# Patient Record
Sex: MALE | Race: WHITE | NOT HISPANIC OR LATINO | Employment: UNEMPLOYED | URBAN - METROPOLITAN AREA
[De-identification: names, ages, dates, MRNs, and addresses within clinical notes are randomized per-mention and may not be internally consistent; named-entity substitution may affect disease eponyms.]

---

## 2017-07-25 ENCOUNTER — HISTORICAL (OUTPATIENT)
Dept: RADIOLOGY | Facility: HOSPITAL | Age: 62
End: 2017-07-25

## 2017-07-31 ENCOUNTER — HISTORICAL (OUTPATIENT)
Dept: RADIOLOGY | Facility: HOSPITAL | Age: 62
End: 2017-07-31

## 2017-09-27 ENCOUNTER — HISTORICAL (OUTPATIENT)
Dept: RADIOLOGY | Facility: HOSPITAL | Age: 62
End: 2017-09-27

## 2017-09-27 LAB
BUN SERPL-MCNC: 25 MG/DL (ref 10–20)
CALCIUM SERPL-MCNC: 8.1 MG/DL (ref 8–10.5)
CHLORIDE SERPL-SCNC: 107 MMOL/L (ref 100–108)
CO2 SERPL-SCNC: 32 MMOL/L (ref 21–35)
CREAT SERPL-MCNC: 1.33 MG/DL (ref 0.7–1.3)
GLUCOSE SERPL-MCNC: 93 MG/DL (ref 75–116)
POTASSIUM SERPL-SCNC: 3.8 MMOL/L (ref 3.6–5.2)
SODIUM SERPL-SCNC: 140 MMOL/L (ref 135–145)

## 2017-10-09 ENCOUNTER — HISTORICAL (OUTPATIENT)
Dept: RADIOLOGY | Facility: HOSPITAL | Age: 62
End: 2017-10-09

## 2017-10-26 ENCOUNTER — HISTORICAL (OUTPATIENT)
Dept: RADIOLOGY | Facility: HOSPITAL | Age: 62
End: 2017-10-26

## 2018-01-29 ENCOUNTER — HISTORICAL (OUTPATIENT)
Dept: RADIOLOGY | Facility: HOSPITAL | Age: 63
End: 2018-01-29

## 2018-02-19 ENCOUNTER — HISTORICAL (OUTPATIENT)
Dept: SURGERY | Facility: HOSPITAL | Age: 63
End: 2018-02-19

## 2018-02-19 LAB
INR PPP: 1 (ref 2–3)
PROTHROMBIN TIME: 10.9 SECOND(S) (ref 9.3–11.4)

## 2018-05-25 ENCOUNTER — HISTORICAL (OUTPATIENT)
Dept: RADIOLOGY | Facility: HOSPITAL | Age: 63
End: 2018-05-25

## 2018-10-03 ENCOUNTER — HISTORICAL (OUTPATIENT)
Dept: RADIOLOGY | Facility: HOSPITAL | Age: 63
End: 2018-10-03

## 2019-04-16 ENCOUNTER — HISTORICAL (OUTPATIENT)
Dept: RADIOLOGY | Facility: HOSPITAL | Age: 64
End: 2019-04-16

## 2020-01-31 LAB
ABS NEUT (OLG): 5.1 X10(3)/MCL (ref 1.5–6.9)
ALBUMIN SERPL-MCNC: 3.4 GM/DL (ref 3.4–5)
ALBUMIN/GLOB SERPL: 0.8 RATIO
ALP SERPL-CCNC: 100 UNIT/L (ref 30–113)
ALT SERPL-CCNC: 32 UNIT/L (ref 10–45)
APTT PPP: 37.3 SECOND(S) (ref 25–35)
AST SERPL-CCNC: 17 UNIT/L (ref 15–37)
BILIRUB SERPL-MCNC: 0.4 MG/DL (ref 0.1–0.9)
BILIRUBIN DIRECT+TOT PNL SERPL-MCNC: 0.2 MG/DL
BILIRUBIN DIRECT+TOT PNL SERPL-MCNC: 0.2 MG/DL (ref 0–0.3)
BUN SERPL-MCNC: 22 MG/DL (ref 10–20)
CALCIUM SERPL-MCNC: 8.9 MG/DL (ref 8–10.5)
CHLORIDE SERPL-SCNC: 106 MMOL/L (ref 100–108)
CO2 SERPL-SCNC: 30 MMOL/L (ref 21–35)
CREAT SERPL-MCNC: 1.4 MG/DL (ref 0.7–1.3)
ERYTHROCYTE [DISTWIDTH] IN BLOOD BY AUTOMATED COUNT: 15.5 % (ref 11.5–17)
GLOBULIN SER-MCNC: 4.1 GM/DL
GLUCOSE SERPL-MCNC: 101 MG/DL (ref 75–116)
HCT VFR BLD AUTO: 37.4 % (ref 42–52)
HGB BLD-MCNC: 11.7 GM/DL (ref 14–18)
INR PPP: 1.7 (ref 0–1.2)
MCH RBC QN AUTO: 28 PG (ref 27–34)
MCHC RBC AUTO-ENTMCNC: 31 GM/DL (ref 31–36)
MCV RBC AUTO: 90 FL (ref 80–99)
PLATELET # BLD AUTO: 297 X10(3)/MCL (ref 140–400)
PMV BLD AUTO: 13.1 FL (ref 6.8–10)
POTASSIUM SERPL-SCNC: 3.4 MMOL/L (ref 3.6–5.2)
PROT SERPL-MCNC: 7.5 GM/DL (ref 6.4–8.2)
PROTHROMBIN TIME: 17.4 SECOND(S) (ref 9–12)
RBC # BLD AUTO: 4.15 X10(6)/MCL (ref 4.7–6.1)
SODIUM SERPL-SCNC: 143 MMOL/L (ref 135–145)
WBC # SPEC AUTO: 9.6 X10(3)/MCL (ref 4.5–11.5)

## 2020-02-04 ENCOUNTER — HISTORICAL (OUTPATIENT)
Dept: ANESTHESIOLOGY | Facility: HOSPITAL | Age: 65
End: 2020-02-04

## 2020-02-04 LAB
APPEARANCE, UA: CLEAR
APTT PPP: 27.6 SECOND(S) (ref 25–35)
BACTERIA SPEC CULT: NORMAL /HPF
BILIRUB UR QL STRIP: NEGATIVE
COLOR UR: YELLOW
GLUCOSE (UA): NEGATIVE
HGB UR QL STRIP: NEGATIVE
INR PPP: 1 (ref 0–1.2)
KETONES UR QL STRIP: NEGATIVE
LEUKOCYTE ESTERASE UR QL STRIP: NEGATIVE
NITRITE UR QL STRIP: NEGATIVE
PH UR STRIP: 5.5 [PH]
PROT UR QL STRIP: ABNORMAL
PROTHROMBIN TIME: 10.7 SECOND(S) (ref 9–12)
RBC #/AREA URNS HPF: NORMAL /HPF
SP GR UR STRIP: 1.02
SQUAMOUS EPITHELIAL, UA: NORMAL /LPF
UROBILINOGEN UR STRIP-ACNC: 1 EU/DL
WBC #/AREA URNS HPF: NORMAL /[HPF]

## 2020-09-11 ENCOUNTER — HISTORICAL (OUTPATIENT)
Dept: RADIOLOGY | Facility: HOSPITAL | Age: 65
End: 2020-09-11

## 2020-10-01 ENCOUNTER — HISTORICAL (OUTPATIENT)
Dept: RADIOLOGY | Facility: HOSPITAL | Age: 65
End: 2020-10-01

## 2021-01-06 ENCOUNTER — HISTORICAL (OUTPATIENT)
Dept: LAB | Facility: HOSPITAL | Age: 66
End: 2021-01-06

## 2021-01-06 LAB
FLUAV AG UPPER RESP QL IA.RAPID: NEGATIVE
FLUBV AG UPPER RESP QL IA.RAPID: NEGATIVE
SARS-COV-2 RNA RESP QL NAA+PROBE: NOT DETECTED

## 2021-03-04 ENCOUNTER — HISTORICAL (OUTPATIENT)
Dept: ADMINISTRATIVE | Facility: HOSPITAL | Age: 66
End: 2021-03-04

## 2021-03-04 LAB
BUN SERPL-MCNC: 27.6 MG/DL (ref 8.4–25.7)
CALCIUM SERPL-MCNC: 7.3 MG/DL (ref 8.8–10)
CHLORIDE SERPL-SCNC: 102 MMOL/L (ref 98–107)
CO2 SERPL-SCNC: 31 MMOL/L (ref 23–31)
CREAT SERPL-MCNC: 1.48 MG/DL (ref 0.73–1.18)
CREAT/UREA NIT SERPL: 19
GLUCOSE SERPL-MCNC: 76 MG/DL (ref 82–115)
HCT VFR BLD AUTO: 27.6 % (ref 42–52)
HGB BLD-MCNC: 8.1 GM/DL (ref 14–18)
POTASSIUM SERPL-SCNC: 3.2 MMOL/L (ref 3.5–5.1)
SODIUM SERPL-SCNC: 142 MMOL/L (ref 136–145)

## 2021-03-08 ENCOUNTER — HISTORICAL (OUTPATIENT)
Dept: ADMINISTRATIVE | Facility: HOSPITAL | Age: 66
End: 2021-03-08

## 2021-03-08 LAB
ALBUMIN SERPL-MCNC: 2.9 GM/DL (ref 3.4–4.8)
ALBUMIN/GLOB SERPL: 0.9 RATIO (ref 1.1–2)
ALP SERPL-CCNC: 111 UNIT/L (ref 40–150)
ALT SERPL-CCNC: 40 UNIT/L (ref 0–55)
AST SERPL-CCNC: 62 UNIT/L (ref 5–34)
BILIRUB SERPL-MCNC: 0.2 MG/DL
BILIRUBIN DIRECT+TOT PNL SERPL-MCNC: 0.1 MG/DL (ref 0–0.5)
BILIRUBIN DIRECT+TOT PNL SERPL-MCNC: 0.1 MG/DL (ref 0–0.8)
BUN SERPL-MCNC: 14.3 MG/DL (ref 8.4–25.7)
CALCIUM SERPL-MCNC: 7.8 MG/DL (ref 8.8–10)
CHLORIDE SERPL-SCNC: 100 MMOL/L (ref 98–107)
CO2 SERPL-SCNC: 35 MMOL/L (ref 23–31)
CREAT SERPL-MCNC: 1.2 MG/DL (ref 0.73–1.18)
GLOBULIN SER-MCNC: 3.3 GM/DL (ref 2.4–3.5)
GLUCOSE SERPL-MCNC: 81 MG/DL (ref 82–115)
HCT VFR BLD AUTO: 29.6 % (ref 42–52)
HGB BLD-MCNC: 8.4 GM/DL (ref 14–18)
POTASSIUM SERPL-SCNC: 4.5 MMOL/L (ref 3.5–5.1)
PROT SERPL-MCNC: 6.2 GM/DL (ref 5.8–7.6)
SODIUM SERPL-SCNC: 143 MMOL/L (ref 136–145)

## 2021-03-25 ENCOUNTER — HISTORICAL (OUTPATIENT)
Dept: ENDOSCOPY | Facility: HOSPITAL | Age: 66
End: 2021-03-25

## 2021-08-03 ENCOUNTER — HISTORICAL (OUTPATIENT)
Dept: CARDIOLOGY | Facility: HOSPITAL | Age: 66
End: 2021-08-03

## 2021-08-03 LAB
INR PPP: 3.5 (ref 0–1.3)
INR PPP: 3.6 (ref 0–1.3)
PROTHROMBIN TIME: 34.4 SECOND(S) (ref 12.5–14.5)
PROTHROMBIN TIME: 35 SECOND(S) (ref 12.5–14.5)

## 2021-08-05 ENCOUNTER — HISTORICAL (OUTPATIENT)
Dept: CARDIOLOGY | Facility: HOSPITAL | Age: 66
End: 2021-08-05

## 2021-08-05 LAB
INR PPP: 3.9 (ref 0–1.3)
PROTHROMBIN TIME: 37.5 SECOND(S) (ref 12.5–14.5)

## 2021-08-12 ENCOUNTER — HISTORICAL (OUTPATIENT)
Dept: CARDIOLOGY | Facility: HOSPITAL | Age: 66
End: 2021-08-12

## 2021-08-12 LAB
INR PPP: 1.2 (ref 0–1.3)
PROTHROMBIN TIME: 15.3 SECOND(S) (ref 12.5–14.5)

## 2021-09-16 ENCOUNTER — HISTORICAL (OUTPATIENT)
Dept: RADIOLOGY | Facility: HOSPITAL | Age: 66
End: 2021-09-16

## 2021-11-08 ENCOUNTER — HISTORICAL (OUTPATIENT)
Dept: LAB | Facility: HOSPITAL | Age: 66
End: 2021-11-08

## 2021-11-08 LAB
ABS NEUT (OLG): 19.4 X10(3)/MCL (ref 1.5–6.9)
ALBUMIN SERPL-MCNC: 3.2 GM/DL (ref 3.4–4.8)
ALBUMIN/GLOB SERPL: 0.9 RATIO (ref 1.1–2)
ALP SERPL-CCNC: 116 UNIT/L (ref 40–150)
ALT SERPL-CCNC: 17 UNIT/L (ref 0–55)
APPEARANCE, UA: CLEAR
AST SERPL-CCNC: 24 UNIT/L (ref 5–34)
BACTERIA SPEC CULT: ABNORMAL /HPF
BASOPHILS # BLD AUTO: 0 X10(3)/MCL (ref 0–0.1)
BASOPHILS NFR BLD AUTO: 0 % (ref 0–1)
BILIRUB SERPL-MCNC: 0.8 MG/DL
BILIRUB UR QL STRIP: NEGATIVE
BILIRUBIN DIRECT+TOT PNL SERPL-MCNC: 0.4 MG/DL (ref 0–0.5)
BILIRUBIN DIRECT+TOT PNL SERPL-MCNC: 0.4 MG/DL (ref 0–0.8)
BUN SERPL-MCNC: 16 MG/DL (ref 8.4–25.7)
CALCIUM SERPL-MCNC: 8.5 MG/DL (ref 8.7–10.5)
CHLORIDE SERPL-SCNC: 98 MMOL/L (ref 98–107)
CO2 SERPL-SCNC: 28 MMOL/L (ref 23–31)
COLOR UR: YELLOW
CREAT SERPL-MCNC: 1.75 MG/DL (ref 0.73–1.18)
ERYTHROCYTE [DISTWIDTH] IN BLOOD BY AUTOMATED COUNT: 14.9 % (ref 11.5–17)
FLUAV AG UPPER RESP QL IA.RAPID: NEGATIVE
FLUBV AG UPPER RESP QL IA.RAPID: NEGATIVE
GLOBULIN SER-MCNC: 3.6 GM/DL (ref 2.4–3.5)
GLUCOSE (UA): NEGATIVE MG/DL
GLUCOSE SERPL-MCNC: 124 MG/DL (ref 82–115)
HCT VFR BLD AUTO: 34.4 % (ref 42–52)
HGB BLD-MCNC: 11.2 GM/DL (ref 14–18)
HGB UR QL STRIP: ABNORMAL
IMM GRANULOCYTES # BLD AUTO: 0.34 10*3/UL (ref 0–0.02)
IMM GRANULOCYTES NFR BLD AUTO: 1.5 % (ref 0–0.43)
KETONES UR QL STRIP: NEGATIVE MG/DL
LEUKOCYTE ESTERASE UR QL STRIP: NEGATIVE
LYMPHOCYTES # BLD AUTO: 1.4 X10(3)/MCL (ref 0.5–4.1)
LYMPHOCYTES NFR BLD AUTO: 6 % (ref 15–40)
MCH RBC QN AUTO: 29 PG (ref 27–34)
MCHC RBC AUTO-ENTMCNC: 33 GM/DL (ref 31–36)
MCV RBC AUTO: 88 FL (ref 80–99)
MONOCYTES # BLD AUTO: 2.1 X10(3)/MCL (ref 0–1.1)
MONOCYTES NFR BLD AUTO: 9 % (ref 4–12)
NEUTROPHILS # BLD AUTO: 19.4 X10(3)/MCL (ref 1.5–6.9)
NEUTROPHILS NFR BLD AUTO: 83 % (ref 43–75)
NITRITE UR QL STRIP: NEGATIVE
PH UR STRIP: 5 [PH] (ref 4.6–8)
PLATELET # BLD AUTO: 260 X10(3)/MCL (ref 140–400)
PMV BLD AUTO: 13 FL (ref 6.8–10)
POTASSIUM SERPL-SCNC: 2.7 MMOL/L (ref 3.5–5.1)
PROT SERPL-MCNC: 6.8 GM/DL (ref 5.8–7.6)
PROT UR QL STRIP: NEGATIVE MG/DL
RBC # BLD AUTO: 3.91 X10(6)/MCL (ref 4.7–6.1)
RBC #/AREA URNS HPF: ABNORMAL /HPF
SARS-COV-2 RNA RESP QL NAA+PROBE: NEGATIVE
SODIUM SERPL-SCNC: 136 MMOL/L (ref 136–145)
SP GR UR STRIP: 1.01 (ref 1–1.03)
SQUAMOUS EPITHELIAL, UA: ABNORMAL /LPF
UROBILINOGEN UR STRIP-ACNC: 0.2 EU/DL
WBC # SPEC AUTO: 23.2 X10(3)/MCL (ref 4.5–11.5)
WBC #/AREA URNS HPF: ABNORMAL /[HPF]

## 2021-11-19 ENCOUNTER — HISTORICAL (OUTPATIENT)
Dept: ADMINISTRATIVE | Facility: HOSPITAL | Age: 66
End: 2021-11-19

## 2021-11-28 ENCOUNTER — HISTORICAL (OUTPATIENT)
Dept: ADMINISTRATIVE | Facility: HOSPITAL | Age: 66
End: 2021-11-28

## 2021-11-28 LAB
ABS NEUT (OLG): 6.16 X10(3)/MCL (ref 2.1–9.2)
ALBUMIN SERPL-MCNC: 2.3 GM/DL (ref 3.4–4.8)
ALBUMIN/GLOB SERPL: 0.5 RATIO (ref 1.1–2)
ALP SERPL-CCNC: 140 UNIT/L (ref 40–150)
ALT SERPL-CCNC: <5 UNIT/L (ref 0–55)
AST SERPL-CCNC: 16 UNIT/L (ref 5–34)
BASOPHILS # BLD AUTO: 0.2 X10(3)/MCL (ref 0–0.2)
BASOPHILS NFR BLD AUTO: 2 %
BILIRUB SERPL-MCNC: 0.3 MG/DL
BILIRUBIN DIRECT+TOT PNL SERPL-MCNC: 0.1 MG/DL (ref 0–0.8)
BILIRUBIN DIRECT+TOT PNL SERPL-MCNC: 0.2 MG/DL (ref 0–0.5)
BUN SERPL-MCNC: 9.7 MG/DL (ref 8.4–25.7)
CALCIUM SERPL-MCNC: 8.1 MG/DL (ref 8.7–10.5)
CHLORIDE SERPL-SCNC: 107 MMOL/L (ref 98–107)
CO2 SERPL-SCNC: 26 MMOL/L (ref 23–31)
CREAT SERPL-MCNC: 1.48 MG/DL (ref 0.73–1.18)
CRP SERPL-MCNC: 5.42 MG/DL
EOSINOPHIL # BLD AUTO: 0.4 X10(3)/MCL (ref 0–0.9)
EOSINOPHIL NFR BLD AUTO: 4 %
ERYTHROCYTE [DISTWIDTH] IN BLOOD BY AUTOMATED COUNT: 15.2 % (ref 11.5–17)
ERYTHROCYTE [SEDIMENTATION RATE] IN BLOOD: 86 MM/HR (ref 0–15)
GENTAMICIN TROUGH SERPL-MCNC: <0.5 UG/ML (ref 0–2)
GLOBULIN SER-MCNC: 4.4 GM/DL (ref 2.4–3.5)
GLUCOSE SERPL-MCNC: 91 MG/DL (ref 82–115)
HCT VFR BLD AUTO: 27 % (ref 42–52)
HGB BLD-MCNC: 8.4 GM/DL (ref 14–18)
LYMPHOCYTES # BLD AUTO: 2.1 X10(3)/MCL (ref 0.6–4.6)
LYMPHOCYTES NFR BLD AUTO: 21 %
MCH RBC QN AUTO: 27.5 PG (ref 27–31)
MCHC RBC AUTO-ENTMCNC: 31.1 GM/DL (ref 33–36)
MCV RBC AUTO: 88.5 FL (ref 80–94)
MONOCYTES # BLD AUTO: 1.3 X10(3)/MCL (ref 0.1–1.3)
MONOCYTES NFR BLD AUTO: 13 %
NEUTROPHILS # BLD AUTO: 6.16 X10(3)/MCL (ref 2.1–9.2)
NEUTROPHILS NFR BLD AUTO: 60 %
PLATELET # BLD AUTO: 652 X10(3)/MCL (ref 130–400)
PMV BLD AUTO: 12.3 FL (ref 9.4–12.4)
POTASSIUM SERPL-SCNC: 4.4 MMOL/L (ref 3.5–5.1)
PROT SERPL-MCNC: 6.7 GM/DL (ref 5.8–7.6)
RBC # BLD AUTO: 3.05 X10(6)/MCL (ref 4.7–6.1)
SODIUM SERPL-SCNC: 143 MMOL/L (ref 136–145)
WBC # SPEC AUTO: 10.2 X10(3)/MCL (ref 4.5–11.5)

## 2021-12-04 ENCOUNTER — HISTORICAL (OUTPATIENT)
Dept: ADMINISTRATIVE | Facility: HOSPITAL | Age: 66
End: 2021-12-04

## 2021-12-04 LAB
ABS NEUT (OLG): 4.54 X10(3)/MCL (ref 2.1–9.2)
ALBUMIN SERPL-MCNC: 2.7 GM/DL (ref 3.4–4.8)
ALBUMIN/GLOB SERPL: 0.6 RATIO (ref 1.1–2)
ALP SERPL-CCNC: 166 UNIT/L (ref 40–150)
ALT SERPL-CCNC: <5 UNIT/L (ref 0–55)
AST SERPL-CCNC: 17 UNIT/L (ref 5–34)
BASOPHILS # BLD AUTO: 0.1 X10(3)/MCL (ref 0–0.2)
BASOPHILS NFR BLD AUTO: 2 %
BILIRUB SERPL-MCNC: 0.1 MG/DL
BILIRUBIN DIRECT+TOT PNL SERPL-MCNC: 0 MG/DL (ref 0–0.8)
BILIRUBIN DIRECT+TOT PNL SERPL-MCNC: 0.1 MG/DL (ref 0–0.5)
BUN SERPL-MCNC: 21.9 MG/DL (ref 8.4–25.7)
CALCIUM SERPL-MCNC: 7.4 MG/DL (ref 8.7–10.5)
CHLORIDE SERPL-SCNC: 98 MMOL/L (ref 98–107)
CO2 SERPL-SCNC: 30 MMOL/L (ref 23–31)
CREAT SERPL-MCNC: 1.97 MG/DL (ref 0.73–1.18)
EOSINOPHIL # BLD AUTO: 0.2 X10(3)/MCL (ref 0–0.9)
EOSINOPHIL NFR BLD AUTO: 2 %
ERYTHROCYTE [DISTWIDTH] IN BLOOD BY AUTOMATED COUNT: 15.2 % (ref 11.5–17)
GENTAMICIN TROUGH SERPL-MCNC: 0.9 UG/ML (ref 0–2)
GLOBULIN SER-MCNC: 4.6 GM/DL (ref 2.4–3.5)
GLUCOSE SERPL-MCNC: 97 MG/DL (ref 82–115)
HCT VFR BLD AUTO: 29.6 % (ref 42–52)
HGB BLD-MCNC: 9.3 GM/DL (ref 14–18)
LYMPHOCYTES # BLD AUTO: 2.1 X10(3)/MCL (ref 0.6–4.6)
LYMPHOCYTES NFR BLD AUTO: 26 %
MCH RBC QN AUTO: 27.5 PG (ref 27–31)
MCHC RBC AUTO-ENTMCNC: 31.4 GM/DL (ref 33–36)
MCV RBC AUTO: 87.6 FL (ref 80–94)
MONOCYTES # BLD AUTO: 1 X10(3)/MCL (ref 0.1–1.3)
MONOCYTES NFR BLD AUTO: 12 %
NEUTROPHILS # BLD AUTO: 4.54 X10(3)/MCL (ref 2.1–9.2)
NEUTROPHILS NFR BLD AUTO: 57 %
PLATELET # BLD AUTO: 411 X10(3)/MCL (ref 130–400)
PMV BLD AUTO: 12.4 FL (ref 9.4–12.4)
POTASSIUM SERPL-SCNC: 3.5 MMOL/L (ref 3.5–5.1)
PROT SERPL-MCNC: 7.3 GM/DL (ref 5.8–7.6)
RBC # BLD AUTO: 3.38 X10(6)/MCL (ref 4.7–6.1)
SODIUM SERPL-SCNC: 139 MMOL/L (ref 136–145)
WBC # SPEC AUTO: 7.9 X10(3)/MCL (ref 4.5–11.5)

## 2021-12-10 ENCOUNTER — HISTORICAL (OUTPATIENT)
Dept: ADMINISTRATIVE | Facility: HOSPITAL | Age: 66
End: 2021-12-10

## 2021-12-10 LAB
ABS NEUT (OLG): 5.17 X10(3)/MCL (ref 2.1–9.2)
ALBUMIN SERPL-MCNC: 2.8 GM/DL (ref 3.4–4.8)
ALBUMIN/GLOB SERPL: 0.6 RATIO (ref 1.1–2)
ALP SERPL-CCNC: 114 UNIT/L (ref 40–150)
ALT SERPL-CCNC: <5 UNIT/L (ref 0–55)
AST SERPL-CCNC: 24 UNIT/L (ref 5–34)
BASOPHILS # BLD AUTO: 0.1 X10(3)/MCL (ref 0–0.2)
BASOPHILS NFR BLD AUTO: 1 %
BILIRUB SERPL-MCNC: 0.2 MG/DL
BILIRUBIN DIRECT+TOT PNL SERPL-MCNC: 0.1 MG/DL (ref 0–0.5)
BILIRUBIN DIRECT+TOT PNL SERPL-MCNC: 0.1 MG/DL (ref 0–0.8)
BUN SERPL-MCNC: 27.1 MG/DL (ref 8.4–25.7)
CALCIUM SERPL-MCNC: 7 MG/DL (ref 8.7–10.5)
CHLORIDE SERPL-SCNC: 97 MMOL/L (ref 98–107)
CO2 SERPL-SCNC: 32 MMOL/L (ref 23–31)
CREAT SERPL-MCNC: 1.76 MG/DL (ref 0.73–1.18)
CRP SERPL-MCNC: 1.12 MG/DL
EOSINOPHIL # BLD AUTO: 0.5 X10(3)/MCL (ref 0–0.9)
EOSINOPHIL NFR BLD AUTO: 5 %
ERYTHROCYTE [DISTWIDTH] IN BLOOD BY AUTOMATED COUNT: 16.2 % (ref 11.5–17)
ERYTHROCYTE [SEDIMENTATION RATE] IN BLOOD: 38 MM/HR (ref 0–15)
GENTAMICIN TROUGH SERPL-MCNC: 0.5 UG/ML (ref 0–2)
GLOBULIN SER-MCNC: 4.7 GM/DL (ref 2.4–3.5)
GLUCOSE SERPL-MCNC: 91 MG/DL (ref 82–115)
HCT VFR BLD AUTO: 29.4 % (ref 42–52)
HGB BLD-MCNC: 9 GM/DL (ref 14–18)
LYMPHOCYTES # BLD AUTO: 2.4 X10(3)/MCL (ref 0.6–4.6)
LYMPHOCYTES NFR BLD AUTO: 26 %
MCH RBC QN AUTO: 27.7 PG (ref 27–31)
MCHC RBC AUTO-ENTMCNC: 30.6 GM/DL (ref 33–36)
MCV RBC AUTO: 90.5 FL (ref 80–94)
MONOCYTES # BLD AUTO: 1.1 X10(3)/MCL (ref 0.1–1.3)
MONOCYTES NFR BLD AUTO: 12 %
NEUTROPHILS # BLD AUTO: 5.17 X10(3)/MCL (ref 2.1–9.2)
NEUTROPHILS NFR BLD AUTO: 55 %
PLATELET # BLD AUTO: 318 X10(3)/MCL (ref 130–400)
PMV BLD AUTO: 13 FL (ref 9.4–12.4)
POTASSIUM SERPL-SCNC: 4.2 MMOL/L (ref 3.5–5.1)
PROT SERPL-MCNC: 7.5 GM/DL (ref 5.8–7.6)
RBC # BLD AUTO: 3.25 X10(6)/MCL (ref 4.7–6.1)
SODIUM SERPL-SCNC: 141 MMOL/L (ref 136–145)
WBC # SPEC AUTO: 9.3 X10(3)/MCL (ref 4.5–11.5)

## 2022-04-10 ENCOUNTER — HISTORICAL (OUTPATIENT)
Dept: ADMINISTRATIVE | Facility: HOSPITAL | Age: 67
End: 2022-04-10
Payer: MEDICARE

## 2022-04-27 VITALS
BODY MASS INDEX: 18.85 KG/M2 | SYSTOLIC BLOOD PRESSURE: 108 MMHG | HEIGHT: 70 IN | WEIGHT: 131.63 LBS | OXYGEN SATURATION: 94 % | DIASTOLIC BLOOD PRESSURE: 65 MMHG

## 2022-04-29 NOTE — OP NOTE
Patient:   Claudio Arango Junior             MRN: [id number]            FIN: [id number]               Age:   64 years     Sex:  Male     :  1955   Associated Diagnoses:   Right inguinal hernia   Author:   Cynthia Cruz MD      Operative Note   Operative Information   Date/ Time:  2020 14:20:00.     Procedures Performed: Procedure Code   Repair initial inguinal hernia, age 5 years or older; reducible (83096)..     Indications: 64-year-old white male with enlarging symptomatic right inguinal hernia electing to undergo right inguinal hernia repair and mesh.     Preoperative Diagnosis: Right inguinal hernia (YDL04-NF K40.90).     Postoperative Diagnosis: Right inguinal hernia ([id number]-KG K40.90).     Surgeon: Cynthia Cruz MD.     Anesthesia: General.     Speciman Removed: Indirect inguinal hernia sac.     Description of Procedure/Findings/    Complications: Patient was brought to the operative theater laid in supine position and general endotracheal intubation and anasthesia provided with preoperative antibiotics administered.  The area of the bilateral groins were then sterilely prepped and draped in normal surgical fashion using chlorhexidine after all areas were trimmed of hair.  The inguinal crease along the right groin was then infiltrated 1% lidocaine and epinephrine.  A 15 blade was used to incise the skin with dissection down to underlying fatty tissues.  Bovie cauterization was used to dissect down to the level of the external oblique fascia.  The fascia was then incised using a 15 blade and lengthened lateral to medial using Metzenbaum scissors.  The cord structures were then identified and encircled using a Penrose drain.  An indirect hernia sac was identified and it was carefully dissected free of the spermatic cord and spermatic vessels.  Upon complete dissection down to the level of the deep inguinal ring it was then ligated using 2-0 silk and transected.  The ends were made hemostatic  using Bovie cauterization and it was released back into the abdomen.  The hernia sac was then sent to pathology.  The floor of the inguinal wall appeared to be healthy and in good condition.  The cavity was made hemostatic using Bovie cauterization.  A segment of mesh was then fashioned around the cord structures and sutured to the pubic tubercle using 3-0 Prolene.  It was then carried over and encircled around the spermatic vessels and sutured interruptedly along the conjoined tendon superiorly.  There was no encroachment upon the cord structures.  The cavity appeared to be very hemostatic at the time of closure.  The external oblique fascia was then reapproximated using 3-0 Vicryl running lateral to medial re-creating the external ring as well as Gianfranco's fascia was reapproximated using 3-0 Vicryl interrupted.  The skin and subcutaneous tissues were then reapproximated using 3-0 Vicryl and running 4-0 subcuticular Monocryl.  Sterile dressing was then placed upon the wound.  Patient was then relieved of anesthesia stable condition transfer the postanesthesia care unit..     Esimated blood loss: loss  5  cc.     Complications: None.     Notes: Patient given instructions to keep the wound dry and ice packs to the groin and to restart anticoagulation therapy within 2 days after surgery while monitoring for hematoma formation.

## 2022-04-29 NOTE — OP NOTE
DATE OF SURGERY:    02/19/2018    SURGEON:  Placido Roe MD    PREOPERATIVE DIAGNOSES:  Chest discomfort in patient with known history of coronary artery disease, coronary artery angioplasty, coronary artery angioplasty and stenting with a history of abnormal nuclear scan which places the patient at increased risk of future adverse cardiovascular outcome such as increased risk of cardiovascular morbidity, increased risk of cardiovascular mortality, increased risk of cardiovascular complications and overall poor medical outcomes and prognosis.    POSTOPERATIVE DIAGNOSES:  Chest discomfort in patient with known history of coronary artery disease, coronary artery angioplasty, coronary artery angioplasty and stenting with a history of abnormal nuclear scan which places the patient at increased risk of future adverse cardiovascular outcome such as increased risk of cardiovascular morbidity, increased risk of cardiovascular mortality, increased risk of cardiovascular complications and overall poor medical outcomes and prognosis.    PROCEDURES:  Left heart catheterization, angiography of right external iliac artery.    PROCEDURE IN DETAIL:  After the procedure was explained to the patient in detail and consents obtained, the right groin was prepped and draped in a sterile fashion.  #5 [place] sheath was placed to the right common femoral artery.  JL4 catheter was advanced to the ascending aorta.  Cannulation of the left main coronary artery was performed.  Angiography revealed luminal irregularities of the coronary circulation.  The previously placed stent in the distal left anterior descending coronary artery was noted to be patent with approximately 50% in-stent restenosis.  Catheter was exchanged for a JR4 catheter.  Cannulation of the right coronary artery was performed.  Angiography revealed the right coronary artery to be dominant in distribution with luminal irregularities.  Catheter was then placed  into the right external iliac artery.  Angiography revealed adequate sheath placement in the right common femoral artery.  The patient tolerated the procedure well.    IMPRESSION:    1. Luminal irregularities of the coronary circulation.   2. Patent stent in the distal left anterior descending coronary artery.   3. Right dominant coronary circulation.   4. Maximize medical treatment.   5. The patient is cleared for back injections.   6. Restart Coumadin due to prosthetic aortic valve.   7. Lovenox bridging.  8. 0 ml blood loss.   9. No specimens removed.   10. Cardiac rehab consultation.   11. Smoking cessation.   12. Sodium restriction.   13. Cardiac diet.        ______________________________  MD CORTNEY Mcnally/MALIHA  DD:  02/19/2018  Time:  11:43AM  DT:  02/19/2018  Time:  04:40PM  Job #:  [id number]

## 2022-04-29 NOTE — H&P
Patient:   Patric Snyder            MRN: [id number]            FIN: [id number]               Age:   65 years     Sex:  Male     :  1955   Associated Diagnoses:   None   Author:   Christopher Jiménez MD      CC: anemia    Subjective: 65 year old with new multifactorial and symptomatic anemia requiring transfusion. No overt bleeding. previous colonoscopy over 10 years ago.  No other GI complaints.  On coumadin for mechanical aortic valve.    Past Medical History:  Anemia    Review of Systems:  Constitutional: no fever, fatigue, weakness  Eye: no vision loss, eye redness, drainage, or pain  ENMT: no sore throat, ear pain, sinus pain/congestion, nasal congestion/drainage  Respiratory: no cough, no wheezing, no shortness of breath  Cardiovascular: no chest pain, no palpitations, no edema  Gastrointestinal: no nausea, vomiting, or diarrhea. No abdominal pain  Genitourinary: no dysuria, no urinary frequency or urgency, no hematuria  Hema/Lymph: no abnormal bruising or bleeding  Endocrine: no heat or cold intolerance, no excessive thirst or excessive urination  Musculoskeletal: no muscle or joint pain, no joint swelling  Integumentary: no skin rash or abnormal lesion  Neurologic: no headache, no dizziness, no weakness or numbness      Physical Exam:  General appearance: alert, cooperative, no distress  HENT: Normocephalic, atraumatic, neck symmetrical, no nasal discharge   Lungs: clear to auscultation bilaterally, symmetric chest wall expansion bilaterally  Heart: regular rate and rhythm without rub; no displacement of the PMI   Abdomen: soft, non-tender, non-distended, normal bowel sounds  Extremities: extremities symmetric; no clubbing, cyanosis, or edema  Neurologic: Alert and oriented X 3, normal strength, normal coordination and gait      Labs:  reviewed.     Imaging:  none    Assessment:  Anemia    Plan:  EGD/Colon

## 2022-05-26 ENCOUNTER — OFFICE VISIT (OUTPATIENT)
Dept: INFECTIOUS DISEASES | Facility: CLINIC | Age: 67
End: 2022-05-26
Payer: MEDICARE

## 2022-05-26 VITALS
RESPIRATION RATE: 16 BRPM | TEMPERATURE: 98 F | DIASTOLIC BLOOD PRESSURE: 85 MMHG | WEIGHT: 121.31 LBS | SYSTOLIC BLOOD PRESSURE: 147 MMHG | HEIGHT: 70 IN | OXYGEN SATURATION: 100 % | HEART RATE: 59 BPM | BODY MASS INDEX: 17.37 KG/M2

## 2022-05-26 DIAGNOSIS — Q89.01 ASPLENIA (CONGENITAL): ICD-10-CM

## 2022-05-26 DIAGNOSIS — N18.30 STAGE 3 CHRONIC KIDNEY DISEASE, UNSPECIFIED WHETHER STAGE 3A OR 3B CKD: ICD-10-CM

## 2022-05-26 DIAGNOSIS — I33.0 ACUTE AND SUBACUTE INFECTIVE ENDOCARDITIS: Primary | ICD-10-CM

## 2022-05-26 DIAGNOSIS — I63.9 CEREBROVASCULAR ACCIDENT (CVA), UNSPECIFIED MECHANISM: ICD-10-CM

## 2022-05-26 DIAGNOSIS — I42.9 CARDIOMYOPATHY, UNSPECIFIED TYPE: ICD-10-CM

## 2022-05-26 DIAGNOSIS — T50.905A ADVERSE EFFECT OF DRUG, INITIAL ENCOUNTER: ICD-10-CM

## 2022-05-26 DIAGNOSIS — Z95.2 S/P TAVR (TRANSCATHETER AORTIC VALVE REPLACEMENT): ICD-10-CM

## 2022-05-26 DIAGNOSIS — Z95.0 PRESENCE OF CARDIAC PACEMAKER: ICD-10-CM

## 2022-05-26 DIAGNOSIS — A41.01 SEPSIS DUE TO STAPHYLOCOCCUS AUREUS: ICD-10-CM

## 2022-05-26 DIAGNOSIS — Z79.2 LONG TERM (CURRENT) USE OF ANTIBIOTICS: ICD-10-CM

## 2022-05-26 PROBLEM — Z95.3 S/P TAVR (TRANSCATHETER AORTIC VALVE REPLACEMENT): Status: ACTIVE | Noted: 2019-06-18

## 2022-05-26 PROCEDURE — 99999 PR PBB SHADOW E&M-EST. PATIENT-LVL V: CPT | Mod: PBBFAC,,, | Performed by: GENERAL PRACTICE

## 2022-05-26 PROCEDURE — 99999 PR PBB SHADOW E&M-EST. PATIENT-LVL V: ICD-10-PCS | Mod: PBBFAC,,, | Performed by: GENERAL PRACTICE

## 2022-05-26 PROCEDURE — 99214 PR OFFICE/OUTPT VISIT, EST, LEVL IV, 30-39 MIN: ICD-10-PCS | Mod: S$PBB,,, | Performed by: GENERAL PRACTICE

## 2022-05-26 PROCEDURE — 99214 OFFICE O/P EST MOD 30 MIN: CPT | Mod: S$PBB,,, | Performed by: GENERAL PRACTICE

## 2022-05-26 PROCEDURE — 99215 OFFICE O/P EST HI 40 MIN: CPT | Mod: PBBFAC | Performed by: GENERAL PRACTICE

## 2022-05-26 RX ORDER — DOCUSATE SODIUM 100 MG/1
1 CAPSULE, LIQUID FILLED ORAL DAILY
COMMUNITY

## 2022-05-26 RX ORDER — WARFARIN 2.5 MG/1
1 TABLET ORAL DAILY
COMMUNITY
Start: 2021-08-12

## 2022-05-26 RX ORDER — EVOLOCUMAB 140 MG/ML
140 INJECTION, SOLUTION SUBCUTANEOUS
COMMUNITY

## 2022-05-26 RX ORDER — AMIODARONE HYDROCHLORIDE 200 MG/1
200 TABLET ORAL DAILY
COMMUNITY

## 2022-05-26 RX ORDER — METHADONE HYDROCHLORIDE 10 MG/1
1 TABLET ORAL EVERY 8 HOURS
COMMUNITY

## 2022-05-26 RX ORDER — POTASSIUM CHLORIDE 750 MG/1
10 CAPSULE, EXTENDED RELEASE ORAL DAILY
COMMUNITY

## 2022-05-26 RX ORDER — FUROSEMIDE 40 MG/1
40 TABLET ORAL DAILY
COMMUNITY

## 2022-05-26 RX ORDER — CEPHALEXIN 500 MG/1
500 TABLET ORAL EVERY 12 HOURS
Qty: 180 TABLET | Refills: 1 | Status: SHIPPED | OUTPATIENT
Start: 2022-05-26 | End: 2022-05-30

## 2022-05-26 RX ORDER — ATORVASTATIN CALCIUM 80 MG/1
80 TABLET, FILM COATED ORAL NIGHTLY
COMMUNITY

## 2022-05-26 RX ORDER — CARVEDILOL 25 MG/1
1 TABLET ORAL DAILY
COMMUNITY

## 2022-05-26 RX ORDER — DULOXETIN HYDROCHLORIDE 60 MG/1
1 CAPSULE, DELAYED RELEASE ORAL DAILY
COMMUNITY

## 2022-05-26 RX ORDER — SACUBITRIL AND VALSARTAN 49; 51 MG/1; MG/1
1 TABLET, FILM COATED ORAL 2 TIMES DAILY
COMMUNITY

## 2022-05-26 RX ORDER — ZOLPIDEM TARTRATE 12.5 MG/1
10 TABLET, FILM COATED, EXTENDED RELEASE ORAL NIGHTLY
COMMUNITY

## 2022-05-26 RX ORDER — SILDENAFIL 100 MG/1
1 TABLET, FILM COATED ORAL
COMMUNITY

## 2022-05-26 RX ORDER — PREGABALIN 150 MG/1
150 CAPSULE ORAL DAILY
COMMUNITY

## 2022-05-26 RX ORDER — CEPHALEXIN 500 MG/1
500 TABLET ORAL 4 TIMES DAILY
Qty: 40 TABLET | Refills: 0 | Status: SHIPPED | OUTPATIENT
Start: 2022-05-26 | End: 2022-05-26

## 2022-05-26 NOTE — PROGRESS NOTES
Subjective:       Patient ID: Patric Snyder 66 y.o.     Chief Complaint:   Chief Complaint   Patient presents with    Follow-up     C/o antibiotic causing a rash on his face, arms and hands.        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  Dr. Leon had prescribed a regimen of cefazolin, rifampin, gentamicin.  He had completed his course on 12/17/2021, had his PICC line removed.  He presents today for follow-up  He reports being off antibiotics since removing his PICC line, with no fevers, no chills, no new symptoms.  He denies any changes in his urinary habits, no diarrhea, no constipation.    05/26/2022:  At our previous visit, we had started the patient on doxycycline for chronic suppression given his prostatic valve, ICD and known MSSA bacteremia he presents today with concerns about significant sunburn on his bilateral upper extremities and face after he went on a golfing trip with friends.  Otherwise denies any GI intolerance, no fevers, no chills, no concern for ongoing infection.         Past Medical History:   Diagnosis Date    Heart failure, unspecified     Hypertension         Past Surgical History:   Procedure Laterality Date    BACK SURGERY      CARDIAC DEFIBRILLATOR PLACEMENT      FINGER AMPUTATION      SPLENECTOMY, PARTIAL          Social History     Socioeconomic History    Marital status: Unknown   Tobacco Use    Smoking status: Never Smoker    Smokeless tobacco: Current User     Types: Chew   Substance and Sexual Activity    Alcohol use: Not Currently    Drug use: Never    Sexual activity: Yes     Partners: Female        Family History   Problem Relation Age of Onset    Diabetes Mother         Review of patient's allergies indicates:  "  Allergen Reactions    Doxycycline hyclate Rash     Photosensitivity        Immunization History   Administered Date(s) Administered    Tdap 12/02/2015        Review of Systems   Constitutional: Negative for chills, fever and weight loss.   HENT: Negative for sore throat.    Eyes: Negative for blurred vision.   Respiratory: Negative for cough, sputum production, shortness of breath and wheezing.    Cardiovascular: Negative for chest pain.   Gastrointestinal: Negative for abdominal pain, diarrhea, nausea and vomiting.   Genitourinary: Negative for dysuria, frequency and urgency.   Musculoskeletal: Negative for myalgias.   Skin: Negative for rash.        Sunburn on bilateral upper extremities and face   Neurological: Negative for headaches.   Psychiatric/Behavioral: Negative for depression and substance abuse.          Objective:      BP (!) 147/85 (Patient Position: Sitting, BP Method: Medium (Manual))   Pulse (!) 59   Temp 97.6 °F (36.4 °C) (Oral)   Resp 16   Ht 5' 10" (1.778 m)   Wt 55 kg (121 lb 4.8 oz)   SpO2 100%   BMI 17.40 kg/m²      Physical Exam  Constitutional:       Appearance: Normal appearance.   HENT:      Head: Normocephalic and atraumatic.      Mouth/Throat:      Pharynx: No oropharyngeal exudate or posterior oropharyngeal erythema.   Eyes:      Extraocular Movements: Extraocular movements intact.      Pupils: Pupils are equal, round, and reactive to light.   Cardiovascular:      Rate and Rhythm: Normal rate and regular rhythm.      Heart sounds: No murmur heard.  Pulmonary:      Effort: No respiratory distress.      Breath sounds: No wheezing, rhonchi or rales.   Abdominal:      General: Bowel sounds are normal. There is no distension.      Palpations: Abdomen is soft.      Tenderness: There is no abdominal tenderness. There is no right CVA tenderness or left CVA tenderness.   Musculoskeletal:         General: No swelling or tenderness.      Cervical back: Neck supple. No rigidity or " tenderness.   Lymphadenopathy:      Cervical: No cervical adenopathy.   Skin:     Findings: No lesion or rash.      Comments: Sunburn on bilateral upper extremities and face   Neurological:      General: No focal deficit present.      Mental Status: He is alert and oriented to person, place, and time. Mental status is at baseline.      Cranial Nerves: No cranial nerve deficit.      Motor: No weakness.   Psychiatric:         Mood and Affect: Mood normal.         Behavior: Behavior normal.            Assessment:       Problem List Items Addressed This Visit        Neuro    Cerebrovascular accident (CVA)       Cardiac/Vascular    Acute and subacute infective endocarditis - Primary    Relevant Medications    cephalexin (KEFTAB) 500 mg tablet    Cardiomyopathy    S/P TAVR (transcatheter aortic valve replacement)    Relevant Medications    cephalexin (KEFTAB) 500 mg tablet    Presence of cardiac pacemaker       Renal/    Stage 3 chronic kidney disease       ID    Long term (current) use of antibiotics    Sepsis due to Staphylococcus aureus    Relevant Medications    cephalexin (KEFTAB) 500 mg tablet       GI    Asplenia (congenital)    Relevant Medications    cephalexin (KEFTAB) 500 mg tablet       Other    Adverse drug reaction             Plan:       -Patient completed 4 weeks of IV therapy with cefazolin, gentamicin, rifampin from 11/17/2021 -12/17/2021  -Given his underlying comorbidities especially presence of ICD and mechanical aortic valve as well as aortic root graft I would have preferred for him to receive 6 weeks of IV therapy  -started on doxycycline on 12/23/2021 however now with significant sunburns on bilateral upper extremities and face  -stop doxycycline  -start chronic suppression with cephalexin 500 mg p.o. q.12 hours  -counseled about potential side effects of cephalexin including rash and diarrhea as well as C diff colitis    Follow up in about 3 months (around 8/26/2022).

## 2022-05-30 RX ORDER — CEPHALEXIN 500 MG/1
500 TABLET ORAL EVERY 12 HOURS
Qty: 180 TABLET | Refills: 1 | Status: SHIPPED | OUTPATIENT
Start: 2022-05-30 | End: 2022-11-22 | Stop reason: SDUPTHER

## 2022-09-12 ENCOUNTER — OFFICE VISIT (OUTPATIENT)
Dept: INFECTIOUS DISEASES | Facility: CLINIC | Age: 67
End: 2022-09-12
Payer: MEDICARE

## 2022-09-12 VITALS
OXYGEN SATURATION: 98 % | WEIGHT: 119.06 LBS | HEIGHT: 70 IN | HEART RATE: 67 BPM | TEMPERATURE: 98 F | SYSTOLIC BLOOD PRESSURE: 143 MMHG | DIASTOLIC BLOOD PRESSURE: 84 MMHG | RESPIRATION RATE: 18 BRPM | BODY MASS INDEX: 17.04 KG/M2

## 2022-09-12 DIAGNOSIS — I63.9 CEREBROVASCULAR ACCIDENT (CVA), UNSPECIFIED MECHANISM: ICD-10-CM

## 2022-09-12 DIAGNOSIS — A41.01 SEPSIS DUE TO STAPHYLOCOCCUS AUREUS: ICD-10-CM

## 2022-09-12 DIAGNOSIS — R78.81 MSSA BACTEREMIA: ICD-10-CM

## 2022-09-12 DIAGNOSIS — Z95.2 S/P TAVR (TRANSCATHETER AORTIC VALVE REPLACEMENT): ICD-10-CM

## 2022-09-12 DIAGNOSIS — I10 PRIMARY HYPERTENSION: ICD-10-CM

## 2022-09-12 DIAGNOSIS — Q89.01 ASPLENIA (CONGENITAL): ICD-10-CM

## 2022-09-12 DIAGNOSIS — I42.9 CARDIOMYOPATHY, UNSPECIFIED TYPE: ICD-10-CM

## 2022-09-12 DIAGNOSIS — Z51.81 THERAPEUTIC DRUG MONITORING: Primary | ICD-10-CM

## 2022-09-12 DIAGNOSIS — Z95.0 PRESENCE OF CARDIAC PACEMAKER: ICD-10-CM

## 2022-09-12 DIAGNOSIS — I33.0 ACUTE AND SUBACUTE INFECTIVE ENDOCARDITIS: ICD-10-CM

## 2022-09-12 DIAGNOSIS — Z79.2 LONG TERM (CURRENT) USE OF ANTIBIOTICS: ICD-10-CM

## 2022-09-12 DIAGNOSIS — Z95.0 PACEMAKER: ICD-10-CM

## 2022-09-12 DIAGNOSIS — B95.61 MSSA BACTEREMIA: ICD-10-CM

## 2022-09-12 PROCEDURE — 99999 PR PBB SHADOW E&M-EST. PATIENT-LVL IV: ICD-10-PCS | Mod: PBBFAC,,, | Performed by: GENERAL PRACTICE

## 2022-09-12 PROCEDURE — 99214 OFFICE O/P EST MOD 30 MIN: CPT | Mod: S$PBB,,, | Performed by: GENERAL PRACTICE

## 2022-09-12 PROCEDURE — 99214 OFFICE O/P EST MOD 30 MIN: CPT | Mod: PBBFAC | Performed by: GENERAL PRACTICE

## 2022-09-12 PROCEDURE — 99999 PR PBB SHADOW E&M-EST. PATIENT-LVL IV: CPT | Mod: PBBFAC,,, | Performed by: GENERAL PRACTICE

## 2022-09-12 PROCEDURE — 99214 PR OFFICE/OUTPT VISIT, EST, LEVL IV, 30-39 MIN: ICD-10-PCS | Mod: S$PBB,,, | Performed by: GENERAL PRACTICE

## 2022-09-12 NOTE — PROGRESS NOTES
Subjective:       Patient ID: Patric Snyder 66 y.o.     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  Dr. Leon had prescribed a regimen of cefazolin, rifampin, gentamicin.  He had completed his course on 12/17/2021, had his PICC line removed.  He presents today for follow-up  He reports being off antibiotics since removing his PICC line, with no fevers, no chills, no new symptoms.  He denies any changes in his urinary habits, no diarrhea, no constipation.    05/26/2022:  At our previous visit, we had started the patient on doxycycline for chronic suppression given his prostatic valve, ICD and known MSSA bacteremia he presents today with concerns about significant sunburn on his bilateral upper extremities and face after he went on a golfing trip with friends.  Otherwise denies any GI intolerance, no fevers, no chills, no concern for ongoing infection.    09/12/2022:  Has been on Cephalexin 500mg PO q12h with excellent adherence. No side effects reported, no diarrhea, no fevers, no chills, no rashes. Tolerating well. No problems with shortness of breath or palpitations.        Past Medical History:   Diagnosis Date    Depression     Heart failure, unspecified     Hyperlipidemia     Hypertension         Past Surgical History:   Procedure Laterality Date    BACK SURGERY      CARDIAC DEFIBRILLATOR PLACEMENT      FINGER AMPUTATION      SPLENECTOMY, PARTIAL          Social History     Socioeconomic History    Marital status: Unknown   Tobacco Use    Smoking status: Never    Smokeless tobacco: Current     Types: Chew   Substance and Sexual Activity    Alcohol use: Not  "Currently    Drug use: Never    Sexual activity: Yes     Partners: Female        Family History   Problem Relation Age of Onset    Diabetes Mother         Review of patient's allergies indicates:   Allergen Reactions    Doxycycline hyclate Rash     Photosensitivity        Immunization History   Administered Date(s) Administered    Tdap 12/02/2015        Review of Systems   Constitutional:  Negative for chills, fever and weight loss.   HENT:  Negative for sore throat.    Eyes:  Negative for blurred vision.   Respiratory:  Negative for cough, sputum production, shortness of breath and wheezing.    Cardiovascular:  Negative for chest pain.   Gastrointestinal:  Negative for abdominal pain, diarrhea, nausea and vomiting.   Genitourinary:  Negative for dysuria, frequency and urgency.   Musculoskeletal:  Negative for myalgias.   Skin:  Negative for rash.        Sunburn on bilateral upper extremities and face   Neurological:  Negative for headaches.   Psychiatric/Behavioral:  Negative for depression and substance abuse.         Objective:      BP (!) 143/84 (BP Location: Left arm)   Pulse 67   Temp 98.1 °F (36.7 °C)   Resp 18   Ht 5' 10" (1.778 m)   Wt 54 kg (119 lb 0.8 oz)   SpO2 98%   BMI 17.08 kg/m²      Physical Exam  Constitutional:       Appearance: Normal appearance.   HENT:      Head: Normocephalic and atraumatic.      Mouth/Throat:      Pharynx: No oropharyngeal exudate or posterior oropharyngeal erythema.   Eyes:      Extraocular Movements: Extraocular movements intact.      Pupils: Pupils are equal, round, and reactive to light.   Cardiovascular:      Rate and Rhythm: Normal rate. Rhythm irregular.      Heart sounds: No murmur heard.     Comments: PPM at AGA chest wall  Pulmonary:      Effort: No respiratory distress.      Breath sounds: No wheezing, rhonchi or rales.   Abdominal:      General: Bowel sounds are normal. There is no distension.      Palpations: Abdomen is soft.      Tenderness: There is no " abdominal tenderness. There is no right CVA tenderness or left CVA tenderness.   Musculoskeletal:         General: No swelling or tenderness.      Cervical back: Neck supple. No rigidity or tenderness.   Lymphadenopathy:      Cervical: No cervical adenopathy.   Skin:     Findings: No lesion or rash.      Comments: Sunburn on bilateral upper extremities and face   Neurological:      General: No focal deficit present.      Mental Status: He is alert and oriented to person, place, and time. Mental status is at baseline.      Cranial Nerves: No cranial nerve deficit.      Motor: No weakness.   Psychiatric:         Mood and Affect: Mood normal.         Behavior: Behavior normal.          Assessment:       Problem List Items Addressed This Visit          Neuro    Cerebrovascular accident (CVA)       Cardiac/Vascular    Acute and subacute infective endocarditis    Cardiomyopathy    S/P TAVR (transcatheter aortic valve replacement)    Presence of cardiac pacemaker       ID    Long term (current) use of antibiotics    Sepsis due to Staphylococcus aureus    MSSA bacteremia       GI    Asplenia (congenital)       Other    Therapeutic drug monitoring - Primary     Other Visit Diagnoses       Pacemaker                   Plan:       -Patient completed 4 weeks of IV therapy with cefazolin, gentamicin, rifampin from 11/17/2021 -12/17/2021  -Given his underlying comorbidities especially presence of ICD and mechanical aortic valve as well as aortic root graft will likely need lifelong suppressive antibiotics given MSSA bacteremia and sepsis  -started on doxycycline on 12/23/2021 however now with significant sunburns on bilateral upper extremities and face  -Continued suppression with Cephalexin 500mg PO q12h which he has been on for about 4 months now with no side effects and good tolerability  -Reports he had some labs done last month with his PCP and all were normal.   -Signed release for us to obtain those labs  -Has enough  refills of medication  -HTN management per PCP    Follow up in about 1 year (around 9/12/2023).    30 minutes of total time spent on the encounter, which includes face to face time and non-face to face time preparing to see the patient (eg, review of tests), Obtaining and/or reviewing separately obtained history, Documenting clinical information in the electronic or other health record, Independently interpreting results (not separately reported) and communicating results to the patient/family/caregiver, or Care coordination (not separately reported).

## 2022-11-22 DIAGNOSIS — Z95.2 S/P TAVR (TRANSCATHETER AORTIC VALVE REPLACEMENT): ICD-10-CM

## 2022-11-22 DIAGNOSIS — Q89.01 ASPLENIA (CONGENITAL): ICD-10-CM

## 2022-11-22 DIAGNOSIS — I33.0 ACUTE AND SUBACUTE INFECTIVE ENDOCARDITIS: Primary | ICD-10-CM

## 2022-11-22 DIAGNOSIS — A41.01 SEPSIS DUE TO STAPHYLOCOCCUS AUREUS: ICD-10-CM

## 2022-11-22 RX ORDER — CEPHALEXIN 500 MG/1
500 TABLET ORAL EVERY 12 HOURS
Qty: 180 TABLET | Refills: 1 | Status: SHIPPED | OUTPATIENT
Start: 2022-11-22 | End: 2023-05-21

## 2023-06-01 DIAGNOSIS — I33.0 ACUTE AND SUBACUTE INFECTIVE ENDOCARDITIS: Primary | ICD-10-CM

## 2023-06-01 RX ORDER — CEPHALEXIN 500 MG/1
500 CAPSULE ORAL EVERY 12 HOURS
Qty: 180 CAPSULE | Refills: 0 | Status: SHIPPED | OUTPATIENT
Start: 2023-06-01 | End: 2023-08-23 | Stop reason: SDUPTHER

## 2023-06-01 RX ORDER — CEPHALEXIN 500 MG/1
500 CAPSULE ORAL EVERY 12 HOURS
COMMUNITY
Start: 2023-02-21 | End: 2023-06-01 | Stop reason: SDUPTHER

## 2023-08-23 ENCOUNTER — OFFICE VISIT (OUTPATIENT)
Dept: INFECTIOUS DISEASES | Facility: CLINIC | Age: 68
End: 2023-08-23
Payer: MEDICARE

## 2023-08-23 VITALS
OXYGEN SATURATION: 99 % | RESPIRATION RATE: 18 BRPM | DIASTOLIC BLOOD PRESSURE: 66 MMHG | BODY MASS INDEX: 17.93 KG/M2 | SYSTOLIC BLOOD PRESSURE: 102 MMHG | WEIGHT: 125.25 LBS | HEIGHT: 70 IN | TEMPERATURE: 98 F | HEART RATE: 61 BPM

## 2023-08-23 DIAGNOSIS — Z95.0 PRESENCE OF CARDIAC PACEMAKER: Primary | ICD-10-CM

## 2023-08-23 DIAGNOSIS — R78.81 MSSA BACTEREMIA: ICD-10-CM

## 2023-08-23 DIAGNOSIS — A41.01 SEPSIS DUE TO STAPHYLOCOCCUS AUREUS: ICD-10-CM

## 2023-08-23 DIAGNOSIS — Q89.01 ASPLENIA (CONGENITAL): ICD-10-CM

## 2023-08-23 DIAGNOSIS — I33.0 ACUTE AND SUBACUTE INFECTIVE ENDOCARDITIS: ICD-10-CM

## 2023-08-23 DIAGNOSIS — Z95.2 S/P TAVR (TRANSCATHETER AORTIC VALVE REPLACEMENT): ICD-10-CM

## 2023-08-23 DIAGNOSIS — B95.61 MSSA BACTEREMIA: ICD-10-CM

## 2023-08-23 DIAGNOSIS — Z79.2 LONG TERM (CURRENT) USE OF ANTIBIOTICS: ICD-10-CM

## 2023-08-23 PROCEDURE — 99214 PR OFFICE/OUTPT VISIT, EST, LEVL IV, 30-39 MIN: ICD-10-PCS | Mod: S$GLB,,, | Performed by: GENERAL PRACTICE

## 2023-08-23 PROCEDURE — 99999 PR PBB SHADOW E&M-EST. PATIENT-LVL V: CPT | Mod: PBBFAC,,, | Performed by: GENERAL PRACTICE

## 2023-08-23 PROCEDURE — 99214 OFFICE O/P EST MOD 30 MIN: CPT | Mod: S$GLB,,, | Performed by: GENERAL PRACTICE

## 2023-08-23 PROCEDURE — 99999 PR PBB SHADOW E&M-EST. PATIENT-LVL V: ICD-10-PCS | Mod: PBBFAC,,, | Performed by: GENERAL PRACTICE

## 2023-08-23 PROCEDURE — 99215 OFFICE O/P EST HI 40 MIN: CPT | Mod: PBBFAC | Performed by: GENERAL PRACTICE

## 2023-08-23 RX ORDER — NITROGLYCERIN 0.3 MG/1
0.3 TABLET SUBLINGUAL EVERY 5 MIN PRN
COMMUNITY

## 2023-08-23 RX ORDER — TRAZODONE HYDROCHLORIDE 50 MG/1
50 TABLET ORAL NIGHTLY
COMMUNITY

## 2023-08-23 RX ORDER — EVOLOCUMAB 140 MG/ML
140 INJECTION, SOLUTION SUBCUTANEOUS
COMMUNITY

## 2023-08-23 RX ORDER — CEPHALEXIN 500 MG/1
500 CAPSULE ORAL EVERY 12 HOURS
Qty: 60 CAPSULE | Refills: 11 | Status: SHIPPED | OUTPATIENT
Start: 2023-08-23

## 2023-08-23 NOTE — PROGRESS NOTES
Subjective:       Patient ID: Patric Snyder 67 y.o.     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  Dr. Leon had prescribed a regimen of cefazolin, rifampin, gentamicin.  He had completed his course on 12/17/2021, had his PICC line removed.  He presents today for follow-up  He reports being off antibiotics since removing his PICC line, with no fevers, no chills, no new symptoms.  He denies any changes in his urinary habits, no diarrhea, no constipation.    05/26/2022:  At our previous visit, we had started the patient on doxycycline for chronic suppression given his prosthetic valve, ICD and known MSSA bacteremia he presents today with concerns about significant sunburn on his bilateral upper extremities and face after he went on a golfing trip with friends.  Otherwise denies any GI intolerance, no fevers, no chills, no concern for ongoing infection.    09/12/2022:  Has been on Cephalexin 500mg PO q12h with excellent adherence. No side effects reported, no diarrhea, no fevers, no chills, no rashes. Tolerating well. No problems with shortness of breath or palpitations.     08/23/2023:  Presents today for follow up. Has been on Cephalexin  suppression, 100% adherence. Feels very well otherwise    Past Medical History:   Diagnosis Date    Depression     Heart failure, unspecified     Hyperlipidemia     Hypertension         Past Surgical History:   Procedure Laterality Date    BACK SURGERY      CARDIAC DEFIBRILLATOR PLACEMENT      FINGER AMPUTATION      SPLENECTOMY, PARTIAL          Social History     Socioeconomic History    Marital status: Unknown   Tobacco Use     "Smoking status: Never    Smokeless tobacco: Current     Types: Chew   Substance and Sexual Activity    Alcohol use: Not Currently    Drug use: Never    Sexual activity: Yes     Partners: Female        Family History   Problem Relation Age of Onset    Diabetes Mother         Review of patient's allergies indicates:   Allergen Reactions    Doxycycline hyclate Rash     Photosensitivity        Immunization History   Administered Date(s) Administered    Tdap 12/02/2015        Review of Systems   All other systems reviewed and are negative.         Objective:      /66 (BP Location: Right arm)   Pulse 61   Temp 97.9 °F (36.6 °C) (Oral)   Resp 18   Ht 5' 10" (1.778 m)   Wt 56.8 kg (125 lb 3.5 oz)   SpO2 99%   BMI 17.97 kg/m²      Physical Exam  Constitutional:       Appearance: Normal appearance.   HENT:      Head: Normocephalic and atraumatic.      Mouth/Throat:      Pharynx: No oropharyngeal exudate or posterior oropharyngeal erythema.   Eyes:      Extraocular Movements: Extraocular movements intact.      Pupils: Pupils are equal, round, and reactive to light.   Cardiovascular:      Rate and Rhythm: Normal rate. Rhythm irregular.      Heart sounds: Murmur heard.      Comments: PPM at AGA chest wall  Click heard   Pulmonary:      Effort: No respiratory distress.      Breath sounds: No wheezing, rhonchi or rales.   Abdominal:      General: Bowel sounds are normal. There is no distension.      Palpations: Abdomen is soft.      Tenderness: There is no abdominal tenderness. There is no right CVA tenderness or left CVA tenderness.   Musculoskeletal:         General: No swelling or tenderness.      Cervical back: Neck supple. No rigidity or tenderness.   Lymphadenopathy:      Cervical: No cervical adenopathy.   Skin:     Findings: No lesion or rash.      Comments: Sunburn on bilateral upper extremities and face   Neurological:      General: No focal deficit present.      Mental Status: He is alert and oriented to " person, place, and time. Mental status is at baseline.      Cranial Nerves: No cranial nerve deficit.      Motor: No weakness.   Psychiatric:         Mood and Affect: Mood normal.         Behavior: Behavior normal.            Assessment:       Problem List Items Addressed This Visit          Cardiac/Vascular    Acute and subacute infective endocarditis    Relevant Medications    cephALEXin (KEFLEX) 500 MG capsule            Plan:       -Patient completed 4 weeks of IV therapy with cefazolin, gentamicin, rifampin from 11/17/2021 -12/17/2021  -Given his underlying comorbidities especially asplenia, presence of ICD and mechanical aortic valve as well as aortic root graft will likely need lifelong suppressive antibiotics given MSSA bacteremia and sepsis  -started on doxycycline on 12/23/2021 however now with significant sunburns on bilateral upper extremities and face    -Continued suppression with Cephalexin 500mg PO q12h which he has been on for about 4 months now with no side effects and good tolerability  -Reports he had some labs done last week with his PCP and all were normal.   -Signed release for us to obtain those labs    Follow up in about 1 year (around 8/23/2024).    30 minutes of total time spent on the encounter, which includes face to face time and non-face to face time preparing to see the patient (eg, review of tests), Obtaining and/or reviewing separately obtained history, Documenting clinical information in the electronic or other health record, Independently interpreting results (not separately reported) and communicating results to the patient/family/caregiver, or Care coordination (not separately reported).

## 2024-03-22 ENCOUNTER — HOSPITAL ENCOUNTER (EMERGENCY)
Facility: HOSPITAL | Age: 69
Discharge: HOME | End: 2024-03-22
Attending: GENERAL ACUTE CARE HOSPITAL
Payer: MEDICARE

## 2024-03-22 VITALS
RESPIRATION RATE: 16 BRPM | WEIGHT: 127 LBS | HEIGHT: 69 IN | TEMPERATURE: 98 F | SYSTOLIC BLOOD PRESSURE: 125 MMHG | OXYGEN SATURATION: 97 % | BODY MASS INDEX: 18.81 KG/M2 | DIASTOLIC BLOOD PRESSURE: 71 MMHG | HEART RATE: 59 BPM

## 2024-03-22 DIAGNOSIS — M79.5 FOREIGN BODY (FB) IN SOFT TISSUE: Primary | ICD-10-CM

## 2024-03-22 PROCEDURE — 99281 EMR DPT VST MAYX REQ PHY/QHP: CPT

## 2024-03-23 NOTE — ED PROVIDER NOTES
Encounter Date: 3/22/2024       History     Chief Complaint   Patient presents with    Foreign Body in Skin     Reports possible needle in penis from taking a shot last night for ED     See MDM    The history is provided by the patient. No  was used.     Review of patient's allergies indicates:  No Known Allergies  Past Medical History:   Diagnosis Date    Depression     Heart failure, unspecified     Hyperlipidemia     Hypertension      Past Surgical History:   Procedure Laterality Date    BACK SURGERY      CARDIAC DEFIBRILLATOR PLACEMENT      CARDIAC VALVE REPLACEMENT      FINGER AMPUTATION      SPLENECTOMY, PARTIAL       Family History   Problem Relation Age of Onset    Diabetes Mother      Social History     Tobacco Use    Smoking status: Former     Types: Cigarettes    Smokeless tobacco: Current     Types: Chew   Substance Use Topics    Alcohol use: Yes     Comment: occasion    Drug use: Never     Review of Systems   Constitutional:  Negative for fever.   Respiratory:  Negative for cough and shortness of breath.    Cardiovascular:  Negative for chest pain.   Gastrointestinal:  Negative for abdominal pain.   Genitourinary:  Negative for difficulty urinating and dysuria.   Musculoskeletal:  Negative for gait problem.   Skin:  Negative for color change.   Neurological:  Negative for dizziness, speech difficulty and headaches.   Psychiatric/Behavioral:  Negative for hallucinations and suicidal ideas.    All other systems reviewed and are negative.      Physical Exam     Initial Vitals [03/22/24 1940]   BP Pulse Resp Temp SpO2   125/71 (!) 59 16 97.9 °F (36.6 °C) 97 %      MAP       --         Physical Exam    Nursing note and vitals reviewed.  Constitutional: He appears well-developed and well-nourished.   HENT:   Head: Normocephalic.   Eyes: EOM are normal.   Neck: Neck supple.   Normal range of motion.  Cardiovascular:  Normal rate, regular rhythm, normal heart sounds and intact distal pulses.  "          Pulmonary/Chest: Breath sounds normal.   Abdominal: Abdomen is soft. Bowel sounds are normal.   Genitourinary:    Genitourinary Comments: Chaperone: Jone     Musculoskeletal:         General: Normal range of motion.      Cervical back: Normal range of motion and neck supple.     Neurological: He is alert and oriented to person, place, and time. He has normal strength.   Skin: Skin is warm and dry. Capillary refill takes less than 2 seconds.   Psychiatric: He has a normal mood and affect. His behavior is normal. Judgment and thought content normal.         ED Course   Foreign Body    Date/Time: 3/22/2024 8:05 PM    Performed by: Shadi Mullen FNP  Authorized by: Lorri Aguilar MD  Consent Done: Yes  Consent: Verbal consent obtained.  Consent given by: patient  Patient understanding: patient states understanding of the procedure being performed  Patient consent: the patient's understanding of the procedure matches consent given  Relevant documents: relevant documents present and verified  Patient identity confirmed: , name, verbally with patient, MRN and provided demographic data  Time out: Immediately prior to procedure a "time out" was called to verify the correct patient, procedure, equipment, support staff and site/side marked as required.  Intake: Penis.    Patient sedated: no  1 objects recovered.  Objects recovered: Needle  Post-procedure assessment: foreign body removed  Patient tolerance: Patient tolerated the procedure well with no immediate complications  Comments: Was able to push the needle through the soft tissue of the penis and retrieve it      Labs Reviewed - No data to display       Imaging Results    None          Medications - No data to display  Medical Decision Making  Historian:  Patient.  Patient is a 68-year-old male  that presents with erectile dysfunction shot needle and soft tissue of penis that has been present last night. Associated symptoms nothing. Surrounding " information is patient gives himself injections for rectal dysfunction. Exacerbated by nothing. Relieved by nothing. Patient treatment prior to arrival none. Risk factors include none. Other history pertaining to this complaint nothing.   Assessment:  See physical exam.  DD:  Foreign body soft tissue  ED Course: History was obtained.  Physical was performed.  Was able to remove the foreign body without difficulty. Medical or surgical consults:  None. Social determinants that affect healthcare:  None.                                         Clinical Impression:  Final diagnoses:  [M79.5] Foreign body (FB) in soft tissue (Primary)          ED Disposition Condition    Discharge Stable          ED Prescriptions    None       Follow-up Information       Follow up With Specialties Details Why Contact Info    Your Primary Care Provider  Call in 3 days ed follow up              Shadi Mullen FNP  03/22/24 2006

## 2024-09-23 DIAGNOSIS — I33.0 ACUTE AND SUBACUTE INFECTIVE ENDOCARDITIS: ICD-10-CM

## 2024-09-23 RX ORDER — CEPHALEXIN 500 MG/1
500 CAPSULE ORAL EVERY 12 HOURS
Qty: 60 CAPSULE | Refills: 11 | Status: SHIPPED | OUTPATIENT
Start: 2024-09-23

## 2024-09-25 ENCOUNTER — OFFICE VISIT (OUTPATIENT)
Dept: INFECTIOUS DISEASES | Facility: CLINIC | Age: 69
End: 2024-09-25
Payer: MEDICARE

## 2024-09-25 VITALS
HEIGHT: 69 IN | HEART RATE: 60 BPM | OXYGEN SATURATION: 98 % | SYSTOLIC BLOOD PRESSURE: 111 MMHG | DIASTOLIC BLOOD PRESSURE: 70 MMHG | BODY MASS INDEX: 18.07 KG/M2 | RESPIRATION RATE: 18 BRPM | WEIGHT: 122 LBS

## 2024-09-25 DIAGNOSIS — I33.0 ACUTE AND SUBACUTE INFECTIVE ENDOCARDITIS: Primary | ICD-10-CM

## 2024-09-25 DIAGNOSIS — Q89.01 ASPLENIA (CONGENITAL): ICD-10-CM

## 2024-09-25 DIAGNOSIS — B95.61 MSSA BACTEREMIA: ICD-10-CM

## 2024-09-25 DIAGNOSIS — Z79.2 LONG TERM (CURRENT) USE OF ANTIBIOTICS: ICD-10-CM

## 2024-09-25 DIAGNOSIS — R78.81 MSSA BACTEREMIA: ICD-10-CM

## 2024-09-25 DIAGNOSIS — Z95.0 PRESENCE OF CARDIAC PACEMAKER: ICD-10-CM

## 2024-09-25 PROCEDURE — 99999 PR PBB SHADOW E&M-EST. PATIENT-LVL IV: CPT | Mod: PBBFAC,,,

## 2024-09-25 PROCEDURE — 99214 OFFICE O/P EST MOD 30 MIN: CPT | Mod: S$PBB,,,

## 2024-09-25 PROCEDURE — 99214 OFFICE O/P EST MOD 30 MIN: CPT | Mod: PBBFAC

## 2024-09-25 NOTE — PROGRESS NOTES
Subjective:       Patient ID: Patric Snyder 69 y.o.     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  Dr. Leon had prescribed a regimen of cefazolin, rifampin, gentamicin.  He had completed his course on 12/17/2021, had his PICC line removed.  He presents today for follow-up  He reports being off antibiotics since removing his PICC line, with no fevers, no chills, no new symptoms.  He denies any changes in his urinary habits, no diarrhea, no constipation.    05/26/2022:  At our previous visit, we had started the patient on doxycycline for chronic suppression given his prosthetic valve, ICD and known MSSA bacteremia he presents today with concerns about significant sunburn on his bilateral upper extremities and face after he went on a golfing trip with friends.  Otherwise denies any GI intolerance, no fevers, no chills, no concern for ongoing infection.    09/12/2022:  Has been on Cephalexin 500mg PO q12h with excellent adherence. No side effects reported, no diarrhea, no fevers, no chills, no rashes. Tolerating well. No problems with shortness of breath or palpitations.     08/23/2023:  Presents today for follow up. Has been on Cephalexin  suppression, 100% adherence. Feels very well otherwise    09/25/2024:   Presents for follow-up today.  He continues on cephalexin suppression with 100% adherence.  Reports he had lab work this year with Dr. Jeronimo .  He denies any complaints today.    Past Medical History:   Diagnosis Date    Depression     Heart failure, unspecified     Hyperlipidemia     Hypertension         Past Surgical History:   Procedure  "Laterality Date    BACK SURGERY      CARDIAC DEFIBRILLATOR PLACEMENT      CARDIAC VALVE REPLACEMENT      FINGER AMPUTATION      SPLENECTOMY, PARTIAL          Social History     Socioeconomic History    Marital status: Unknown   Tobacco Use    Smoking status: Former     Types: Cigarettes    Smokeless tobacco: Current     Types: Chew   Substance and Sexual Activity    Alcohol use: Yes     Comment: occasion    Drug use: Never    Sexual activity: Yes     Partners: Female     Social Drivers of Health     Housing Stability: Unknown (11/10/2022)    Received from [facility] Subsidiaries and Affiliates, [employer] [facility] and [facility] Subsidiaries and Affiliates    Housing Stability Vital Sign     Unable to Pay for Housing in the Last Year: No        Family History   Problem Relation Name Age of Onset    Diabetes Mother          Review of patient's allergies indicates:  No Known Allergies       Immunization History   Administered Date(s) Administered    Tdap 12/02/2015        Review of Systems   All other systems reviewed and are negative.         Objective:      /70 (BP Location: Right arm)   Pulse 60   Resp 18   Ht 5' 9" (1.753 m)   Wt 55.3 kg (122 lb)   SpO2 98%   BMI 18.02 kg/m²      Physical Exam  Constitutional:       Appearance: Normal appearance.   HENT:      Head: Normocephalic and atraumatic.      Mouth/Throat:      Pharynx: No oropharyngeal exudate or posterior oropharyngeal erythema.   Eyes:      Extraocular Movements: Extraocular movements intact.      Pupils: Pupils are equal, round, and reactive to light.   Cardiovascular:      Rate and Rhythm: Normal rate. Rhythm irregular.      Heart sounds: Murmur heard.      Comments: PPM at AGA chest wall  Click heard   Pulmonary:      Effort: No respiratory distress.      Breath sounds: No wheezing, rhonchi or rales.   Abdominal:      General: Bowel sounds are normal. There is no " distension.      Palpations: Abdomen is soft.      Tenderness: There is no abdominal tenderness. There is no right CVA tenderness or left CVA tenderness.   Musculoskeletal:         General: No swelling or tenderness.      Cervical back: Neck supple. No rigidity or tenderness.   Lymphadenopathy:      Cervical: No cervical adenopathy.   Skin:     Findings: No lesion or rash.      Comments: Sunburn on bilateral upper extremities and face   Neurological:      General: No focal deficit present.      Mental Status: He is alert and oriented to person, place, and time. Mental status is at baseline.      Cranial Nerves: No cranial nerve deficit.      Motor: No weakness.   Psychiatric:         Mood and Affect: Mood normal.         Behavior: Behavior normal.            Assessment:       Problem List Items Addressed This Visit          Cardiac/Vascular    Acute and subacute infective endocarditis - Primary    Presence of cardiac pacemaker       ID    Long term (current) use of antibiotics    MSSA bacteremia       GI    Asplenia (congenital)              Plan:       -Patient completed 4 weeks of IV therapy with cefazolin, gentamicin, rifampin from 11/17/2021 -12/17/2021  -Given his underlying comorbidities especially asplenia, presence of ICD and mechanical aortic valve as well as aortic root graft will likely need lifelong suppressive antibiotics given MSSA bacteremia and sepsis  -started on doxycycline on 12/23/2021 however now with significant sunburns on bilateral upper extremities and face    -Continued suppression with Cephalexin 500mg PO q12h which he has been on for a year and a half now with no side effects and good tolerability.  -reports that he had labs done with his PCP now were normal however we will obtain  -Dr. Sorenson cardiologist follows valve   -can follow up again in 1 year    Follow up in about 1 year (around 9/25/2025).    30 minutes of total time spent on the encounter, which includes face to face time and  non-face to face time preparing to see the patient (eg, review of tests), Obtaining and/or reviewing separately obtained history, Documenting clinical information in the electronic or other health record, Independently interpreting results (not separately reported) and communicating results to the patient/family/caregiver, or Care coordination (not separately reported).

## 2025-06-17 ENCOUNTER — HOSPITAL ENCOUNTER (EMERGENCY)
Facility: HOSPITAL | Age: 70
Discharge: HOME | End: 2025-06-17
Attending: EMERGENCY MEDICINE
Payer: MEDICARE

## 2025-06-17 VITALS
SYSTOLIC BLOOD PRESSURE: 129 MMHG | TEMPERATURE: 98 F | HEART RATE: 61 BPM | DIASTOLIC BLOOD PRESSURE: 71 MMHG | WEIGHT: 128.38 LBS | BODY MASS INDEX: 19.01 KG/M2 | OXYGEN SATURATION: 98 % | RESPIRATION RATE: 19 BRPM | HEIGHT: 69 IN

## 2025-06-17 DIAGNOSIS — S51.812A SKIN TEAR OF LEFT FOREARM WITHOUT COMPLICATION, INITIAL ENCOUNTER: Primary | ICD-10-CM

## 2025-06-17 PROCEDURE — 90471 IMMUNIZATION ADMIN: CPT | Performed by: EMERGENCY MEDICINE

## 2025-06-17 PROCEDURE — 99283 EMERGENCY DEPT VISIT LOW MDM: CPT | Mod: 25

## 2025-06-17 PROCEDURE — 90715 TDAP VACCINE 7 YRS/> IM: CPT | Performed by: EMERGENCY MEDICINE

## 2025-06-17 PROCEDURE — 63600175 PHARM REV CODE 636 W HCPCS: Performed by: EMERGENCY MEDICINE

## 2025-06-17 RX ADMIN — CLOSTRIDIUM TETANI TOXOID ANTIGEN (FORMALDEHYDE INACTIVATED), CORYNEBACTERIUM DIPHTHERIAE TOXOID ANTIGEN (FORMALDEHYDE INACTIVATED), BORDETELLA PERTUSSIS TOXOID ANTIGEN (GLUTARALDEHYDE INACTIVATED), BORDETELLA PERTUSSIS FILAMENTOUS HEMAGGLUTININ ANTIGEN (FORMALDEHYDE INACTIVATED), BORDETELLA PERTUSSIS PERTACTIN ANTIGEN, AND BORDETELLA PERTUSSIS FIMBRIAE 2/3 ANTIGEN 0.5 ML: 5; 2; 2.5; 5; 3; 5 INJECTION, SUSPENSION INTRAMUSCULAR at 11:06

## 2025-06-18 NOTE — ED PROVIDER NOTES
Encounter Date: 6/17/2025       History     Chief Complaint   Patient presents with    Skin Tear     Pt c/o skin tear to left forearm after bumping into a door around 14:00 today. Pt reports taking blood thinners. Dressing applied in triage with bleeding controlled. Picture uploaded into EMR.      At 2:00 a.m. today the patient was helping a friend move some things out of his mobile home.  He had some boxes in his arm and tried to open the door and hit the sharp edge of the door frame.  He has a skin tear in the left forearm that has been oozing blood since that time.  He takes warfarin because of a cardiac valve that was replaced.  His last INR was less than week ago and was 3.2, no changes were made to his warfarin regimen.  He is not up-to-date on his tetanus.        Review of patient's allergies indicates:  No Known Allergies  Past Medical History:   Diagnosis Date    Depression     Heart failure, unspecified     Hyperlipidemia     Hypertension      Past Surgical History:   Procedure Laterality Date    BACK SURGERY      CARDIAC DEFIBRILLATOR PLACEMENT      CARDIAC VALVE REPLACEMENT      FINGER AMPUTATION      SPLENECTOMY, PARTIAL       Family History   Problem Relation Name Age of Onset    Diabetes Mother       Social History[1]  Review of Systems   All other systems reviewed and are negative.      Physical Exam     Initial Vitals [06/17/25 2240]   BP Pulse Resp Temp SpO2   131/73 60 18 97.8 °F (36.6 °C) 97 %      MAP       --         Physical Exam    Constitutional:   Vital signs reviewed.  Nursing note reviewed.    General:  The patient is awake and alert, appropriate and interactive.    Eyes: Conjunctiva are clear.  Corneas appear normal.  EOMI.  ENT: Face, head, external nose, and ears are normal-appearing.  The oropharynx appears normal.  The uvula is midline.  The posterior pharyngeal elements are symmetric.  Voice is normal.  Mucous membranes moist.  Neck: The neck is nontender and supple with normal  range of motion.  Back: The back appears normal with full range of motion.  Respiratory: The respiratory effort is normal.  The lungs are clear to auscultation.  Cardiovascular: Heart sounds show a click consistent with his mechanical valve.  No murmur or rub.  The rate is normal.  The rhythm is normal.  Peripheral pulses are normal and equal bilaterally.  No edema is present.  Capillary refill is less than 3 seconds.  GI: The abdomen is soft, nondistended, without mass.  There is no tenderness to palpation.  No rebound or guarding.  Bowel sounds are normal.  The liver and spleen are nonpalpable.  Musculoskeletal: Range of motion of all joints appears normal without pain or tenderness.  The left upper extremity has a 2 x 3 cm L-shaped skin tear on the dorsum of the forearm.  Skin: There is no rash.  Neurologic: No focal deficits are noted.           ED Course   Procedures  Labs Reviewed - No data to display       Imaging Results    None          Medications   Tdap vaccine injection 0.5 mL (has no administration in time range)     Medical Decision Making  A compression dressing with Coban with a plan to the left upper extremity skin tear with cessation of bleeding.  The plan is to leave this in place for 24-36 hours and have him follow up with the see for recheck.      Decision to admit/transfer/discharge:  After my evaluation of the patient and interpretation of all relevant information, the decision has been made to discharge the patient.    I independently reviewed and interpreted any laboratory tests, radiographic images, EKGs, rhythm strips, and other diagnostic tests that were obtained during this Emergency Department visit.    Drug/medication management:  Parenteral controlled substances and other dangerous medications, if administered, can be found in the order section of this chart.  I reviewed the patient's home medications and made changes/adjustments as medically indicated.  Any new medications are  documented under the prescription section of this chart.    Social determinants of health addressed during this ED visit:  The patient/caregiver knowledge deficit about today's condition and treatment plan was addressed by me through appropriate patient/caregiver education.    Additional verbal discharge instructions were given and discussed with the patient.  I have spoken with the patient and/or caregivers. I have explained the patient's condition, diagnoses and treatment plan based on the information available to me at this time. I have answered the patient's and/or caregiver's questions and addressed any concerns. The patient and/or caregivers have as good an understanding of the patient's diagnosis, condition and treatment plan as can be expected at this point. The vital signs have been stable. The patient's condition is stable and appropriate for discharge from the emergency department.     The patient will pursue further outpatient evaluation with the primary care physician or other designated or consulting physician as outlined in the discharge instructions. The patient and/or caregivers are agreeable to this plan of care and follow-up instructions have been explained in detail. The patient and/or caregivers have expressed an understanding of the discharge instructions. The patient and/or caregivers are aware that any significant change in condition or worsening of symptoms should prompt an immediate return to this or the closest emergency department or a call to 911.    Risk  Prescription drug management.                                      Clinical Impression:  Final diagnoses:  [S52.871G] Skin tear of left forearm without complication, initial encounter (Primary)          ED Disposition Condition    Discharge Stable          ED Prescriptions    None       Follow-up Information       Follow up With Specialties Details Why Contact Jennifer Merlos MD Family Medicine  Follow up within 3 days for  glenna. [address] [facility] [address]  [phone]                     [1]   Social History  Tobacco Use    Smoking status: Former     Types: Cigarettes    Smokeless tobacco: Current     Types: Chew   Substance Use Topics    Alcohol use: Yes     Comment: occasion    Drug use: Never        Lew Headley MD  06/18/25 0204

## 2025-06-21 ENCOUNTER — HOSPITAL ENCOUNTER (INPATIENT)
Facility: HOSPITAL | Age: 70
LOS: 2 days | Discharge: HOME | DRG: 871 | End: 2025-06-23
Attending: EMERGENCY MEDICINE | Admitting: EMERGENCY MEDICINE
Payer: MEDICARE

## 2025-06-21 DIAGNOSIS — R55 SYNCOPE: ICD-10-CM

## 2025-06-21 DIAGNOSIS — N17.9 ACUTE KIDNEY INJURY: ICD-10-CM

## 2025-06-21 DIAGNOSIS — J18.9 PNEUMONIA OF BOTH LUNGS DUE TO INFECTIOUS ORGANISM, UNSPECIFIED PART OF LUNG: Primary | ICD-10-CM

## 2025-06-21 LAB
ALBUMIN SERPL-MCNC: 3.4 G/DL (ref 3.4–4.8)
ALBUMIN/GLOB SERPL: 1.2 RATIO (ref 1.1–2)
ALP SERPL-CCNC: 82 UNIT/L (ref 40–150)
ALT SERPL-CCNC: 10 UNIT/L (ref 0–55)
ANION GAP SERPL CALC-SCNC: 9 MEQ/L
APTT PPP: 29.9 SECONDS (ref 24.2–35.9)
AST SERPL-CCNC: 17 UNIT/L (ref 11–45)
BACTERIA #/AREA URNS AUTO: NORMAL /HPF
BASOPHILS # BLD AUTO: 0.02 X10(3)/MCL
BASOPHILS NFR BLD AUTO: 0.1 %
BILIRUB SERPL-MCNC: 0.6 MG/DL
BILIRUB UR QL STRIP.AUTO: NEGATIVE
BNP BLD-MCNC: 803.6 PG/ML
BUN SERPL-MCNC: 49 MG/DL (ref 8.4–25.7)
CALCIUM SERPL-MCNC: 7.8 MG/DL (ref 8.8–10)
CHLORIDE SERPL-SCNC: 107 MMOL/L (ref 98–107)
CLARITY UR: CLEAR
CO2 SERPL-SCNC: 23 MMOL/L (ref 23–31)
COLOR UR AUTO: YELLOW
CREAT SERPL-MCNC: 3.23 MG/DL (ref 0.72–1.25)
CREAT/UREA NIT SERPL: 15
EOSINOPHIL # BLD AUTO: 0.49 X10(3)/MCL (ref 0–0.9)
EOSINOPHIL NFR BLD AUTO: 2.7 %
ERYTHROCYTE [DISTWIDTH] IN BLOOD BY AUTOMATED COUNT: 15.1 % (ref 11.5–17)
FLUAV AG UPPER RESP QL IA.RAPID: NOT DETECTED
FLUBV AG UPPER RESP QL IA.RAPID: NOT DETECTED
GFR SERPLBLD CREATININE-BSD FMLA CKD-EPI: 20 ML/MIN/1.73/M2
GLOBULIN SER-MCNC: 2.9 GM/DL (ref 2.4–3.5)
GLUCOSE SERPL-MCNC: 102 MG/DL (ref 82–115)
GLUCOSE UR QL STRIP: NEGATIVE
HCT VFR BLD AUTO: 29 % (ref 42–52)
HGB BLD-MCNC: 9.2 G/DL (ref 14–18)
HGB UR QL STRIP: ABNORMAL
IMM GRANULOCYTES # BLD AUTO: 0.06 X10(3)/MCL (ref 0–0.04)
IMM GRANULOCYTES NFR BLD AUTO: 0.3 %
INR PPP: 2.7
KETONES UR QL STRIP: NEGATIVE
LACTATE SERPL-SCNC: 1 MMOL/L (ref 0.5–2.2)
LEUKOCYTE ESTERASE UR QL STRIP: NEGATIVE
LIPASE SERPL-CCNC: 15 U/L
LYMPHOCYTES # BLD AUTO: 1.12 X10(3)/MCL (ref 0.6–4.6)
LYMPHOCYTES NFR BLD AUTO: 6.2 %
MCH RBC QN AUTO: 31 PG (ref 27–31)
MCHC RBC AUTO-ENTMCNC: 31.7 G/DL (ref 33–36)
MCV RBC AUTO: 97.6 FL (ref 80–94)
MONOCYTES # BLD AUTO: 1.68 X10(3)/MCL (ref 0.1–1.3)
MONOCYTES NFR BLD AUTO: 9.3 %
NEUTROPHILS # BLD AUTO: 14.76 X10(3)/MCL (ref 2.1–9.2)
NEUTROPHILS NFR BLD AUTO: 81.4 %
NITRITE UR QL STRIP: NEGATIVE
PH UR STRIP: 5.5 [PH]
PLATELET # BLD AUTO: 276 X10(3)/MCL (ref 130–400)
PMV BLD AUTO: 11.6 FL (ref 7.4–10.4)
POTASSIUM SERPL-SCNC: 3.5 MMOL/L (ref 3.5–5.1)
PROT SERPL-MCNC: 6.3 GM/DL (ref 5.8–7.6)
PROT UR QL STRIP: NEGATIVE
PROTHROMBIN TIME: 28.5 SECONDS (ref 12.4–14.9)
RBC # BLD AUTO: 2.97 X10(6)/MCL (ref 4.7–6.1)
RBC #/AREA URNS AUTO: NORMAL /HPF
RSV A 5' UTR RNA NPH QL NAA+PROBE: NOT DETECTED
SARS-COV-2 RNA RESP QL NAA+PROBE: NOT DETECTED
SODIUM SERPL-SCNC: 139 MMOL/L (ref 136–145)
SP GR UR STRIP.AUTO: 1.01 (ref 1–1.03)
SQUAMOUS #/AREA URNS AUTO: NORMAL /HPF
STREP A PCR (OHS): NOT DETECTED
TROPONIN I SERPL-MCNC: 0.03 NG/ML (ref 0–0.04)
UROBILINOGEN UR STRIP-ACNC: 0.2
WBC # BLD AUTO: 18.13 X10(3)/MCL (ref 4.5–11.5)
WBC #/AREA URNS AUTO: NORMAL /HPF

## 2025-06-21 PROCEDURE — 85730 THROMBOPLASTIN TIME PARTIAL: CPT | Performed by: NURSE PRACTITIONER

## 2025-06-21 PROCEDURE — 83880 ASSAY OF NATRIURETIC PEPTIDE: CPT | Performed by: NURSE PRACTITIONER

## 2025-06-21 PROCEDURE — 85025 COMPLETE CBC W/AUTO DIFF WBC: CPT | Performed by: NURSE PRACTITIONER

## 2025-06-21 PROCEDURE — 87651 STREP A DNA AMP PROBE: CPT | Performed by: NURSE PRACTITIONER

## 2025-06-21 PROCEDURE — 87040 BLOOD CULTURE FOR BACTERIA: CPT | Performed by: NURSE PRACTITIONER

## 2025-06-21 PROCEDURE — 0241U COVID/RSV/FLU A&B PCR: CPT | Performed by: NURSE PRACTITIONER

## 2025-06-21 PROCEDURE — 80053 COMPREHEN METABOLIC PANEL: CPT | Performed by: NURSE PRACTITIONER

## 2025-06-21 PROCEDURE — 63600175 PHARM REV CODE 636 W HCPCS: Performed by: EMERGENCY MEDICINE

## 2025-06-21 PROCEDURE — 25000242 PHARM REV CODE 250 ALT 637 W/ HCPCS: Performed by: EMERGENCY MEDICINE

## 2025-06-21 PROCEDURE — 94640 AIRWAY INHALATION TREATMENT: CPT

## 2025-06-21 PROCEDURE — 94761 N-INVAS EAR/PLS OXIMETRY MLT: CPT

## 2025-06-21 PROCEDURE — 99285 EMERGENCY DEPT VISIT HI MDM: CPT | Mod: 25

## 2025-06-21 PROCEDURE — 85610 PROTHROMBIN TIME: CPT | Performed by: NURSE PRACTITIONER

## 2025-06-21 PROCEDURE — 99900035 HC TECH TIME PER 15 MIN (STAT)

## 2025-06-21 PROCEDURE — 93005 ELECTROCARDIOGRAM TRACING: CPT

## 2025-06-21 PROCEDURE — 93010 ELECTROCARDIOGRAM REPORT: CPT | Mod: ,,, | Performed by: INTERNAL MEDICINE

## 2025-06-21 PROCEDURE — 96375 TX/PRO/DX INJ NEW DRUG ADDON: CPT

## 2025-06-21 PROCEDURE — 83690 ASSAY OF LIPASE: CPT | Performed by: NURSE PRACTITIONER

## 2025-06-21 PROCEDURE — 94799 UNLISTED PULMONARY SVC/PX: CPT

## 2025-06-21 PROCEDURE — 84484 ASSAY OF TROPONIN QUANT: CPT | Performed by: NURSE PRACTITIONER

## 2025-06-21 PROCEDURE — 25000003 PHARM REV CODE 250: Performed by: NURSE PRACTITIONER

## 2025-06-21 PROCEDURE — 27000221 HC OXYGEN, UP TO 24 HOURS

## 2025-06-21 PROCEDURE — 25000003 PHARM REV CODE 250: Performed by: EMERGENCY MEDICINE

## 2025-06-21 PROCEDURE — 63600175 PHARM REV CODE 636 W HCPCS: Performed by: NURSE PRACTITIONER

## 2025-06-21 PROCEDURE — 96365 THER/PROPH/DIAG IV INF INIT: CPT

## 2025-06-21 PROCEDURE — 20000000 HC ICU ROOM

## 2025-06-21 PROCEDURE — 83605 ASSAY OF LACTIC ACID: CPT | Performed by: NURSE PRACTITIONER

## 2025-06-21 PROCEDURE — 81003 URINALYSIS AUTO W/O SCOPE: CPT | Performed by: NURSE PRACTITIONER

## 2025-06-21 RX ORDER — SODIUM CHLORIDE 0.9 % (FLUSH) 0.9 %
10 SYRINGE (ML) INJECTION
Status: DISCONTINUED | OUTPATIENT
Start: 2025-06-21 | End: 2025-06-23 | Stop reason: HOSPADM

## 2025-06-21 RX ORDER — ATORVASTATIN CALCIUM 40 MG/1
80 TABLET, FILM COATED ORAL NIGHTLY
Status: DISCONTINUED | OUTPATIENT
Start: 2025-06-21 | End: 2025-06-23 | Stop reason: HOSPADM

## 2025-06-21 RX ORDER — PREGABALIN 75 MG/1
150 CAPSULE ORAL DAILY
Status: DISCONTINUED | OUTPATIENT
Start: 2025-06-21 | End: 2025-06-23 | Stop reason: HOSPADM

## 2025-06-21 RX ORDER — ONDANSETRON HYDROCHLORIDE 2 MG/ML
4 INJECTION, SOLUTION INTRAVENOUS EVERY 6 HOURS PRN
Status: DISCONTINUED | OUTPATIENT
Start: 2025-06-21 | End: 2025-06-23 | Stop reason: HOSPADM

## 2025-06-21 RX ORDER — IPRATROPIUM BROMIDE AND ALBUTEROL SULFATE 2.5; .5 MG/3ML; MG/3ML
3 SOLUTION RESPIRATORY (INHALATION) 3 TIMES DAILY
Status: DISCONTINUED | OUTPATIENT
Start: 2025-06-21 | End: 2025-06-23 | Stop reason: HOSPADM

## 2025-06-21 RX ORDER — ACETAMINOPHEN 325 MG/1
650 TABLET ORAL EVERY 4 HOURS PRN
Status: DISCONTINUED | OUTPATIENT
Start: 2025-06-21 | End: 2025-06-23 | Stop reason: HOSPADM

## 2025-06-21 RX ORDER — WARFARIN 2.5 MG/1
2.5 TABLET ORAL DAILY
Status: DISCONTINUED | OUTPATIENT
Start: 2025-06-21 | End: 2025-06-23 | Stop reason: HOSPADM

## 2025-06-21 RX ORDER — METHADONE HYDROCHLORIDE 10 MG/1
10 TABLET ORAL EVERY 8 HOURS
Refills: 0 | Status: DISCONTINUED | OUTPATIENT
Start: 2025-06-21 | End: 2025-06-23 | Stop reason: HOSPADM

## 2025-06-21 RX ORDER — ACETAMINOPHEN 325 MG/1
650 TABLET ORAL
Status: COMPLETED | OUTPATIENT
Start: 2025-06-21 | End: 2025-06-21

## 2025-06-21 RX ORDER — CEFTRIAXONE 1 G/1
1 INJECTION, POWDER, FOR SOLUTION INTRAMUSCULAR; INTRAVENOUS
Status: COMPLETED | OUTPATIENT
Start: 2025-06-21 | End: 2025-06-21

## 2025-06-21 RX ORDER — SODIUM CHLORIDE, SODIUM LACTATE, POTASSIUM CHLORIDE, CALCIUM CHLORIDE 600; 310; 30; 20 MG/100ML; MG/100ML; MG/100ML; MG/100ML
INJECTION, SOLUTION INTRAVENOUS CONTINUOUS
Status: DISCONTINUED | OUTPATIENT
Start: 2025-06-21 | End: 2025-06-23

## 2025-06-21 RX ORDER — FAMOTIDINE 20 MG/1
20 TABLET, FILM COATED ORAL DAILY
Status: DISCONTINUED | OUTPATIENT
Start: 2025-06-21 | End: 2025-06-23 | Stop reason: HOSPADM

## 2025-06-21 RX ORDER — CEFTRIAXONE 1 G/1
1 INJECTION, POWDER, FOR SOLUTION INTRAMUSCULAR; INTRAVENOUS
Status: DISCONTINUED | OUTPATIENT
Start: 2025-06-21 | End: 2025-06-23 | Stop reason: HOSPADM

## 2025-06-21 RX ORDER — TRAZODONE HYDROCHLORIDE 50 MG/1
50 TABLET ORAL NIGHTLY
Status: DISCONTINUED | OUTPATIENT
Start: 2025-06-21 | End: 2025-06-23 | Stop reason: HOSPADM

## 2025-06-21 RX ORDER — DULOXETIN HYDROCHLORIDE 60 MG/1
60 CAPSULE, DELAYED RELEASE ORAL DAILY
Status: DISCONTINUED | OUTPATIENT
Start: 2025-06-21 | End: 2025-06-23 | Stop reason: HOSPADM

## 2025-06-21 RX ADMIN — IPRATROPIUM BROMIDE AND ALBUTEROL SULFATE 3 ML: .5; 3 SOLUTION RESPIRATORY (INHALATION) at 02:06

## 2025-06-21 RX ADMIN — ATORVASTATIN CALCIUM 80 MG: 40 TABLET, FILM COATED ORAL at 10:06

## 2025-06-21 RX ADMIN — ACETAMINOPHEN 650 MG: 325 TABLET, FILM COATED ORAL at 12:06

## 2025-06-21 RX ADMIN — SODIUM CHLORIDE 500 ML: 9 INJECTION, SOLUTION INTRAVENOUS at 01:06

## 2025-06-21 RX ADMIN — WARFARIN SODIUM 2.5 MG: 2.5 TABLET ORAL at 10:06

## 2025-06-21 RX ADMIN — SODIUM CHLORIDE 250 ML: 9 INJECTION, SOLUTION INTRAVENOUS at 02:06

## 2025-06-21 RX ADMIN — SODIUM CHLORIDE, POTASSIUM CHLORIDE, SODIUM LACTATE AND CALCIUM CHLORIDE: 600; 310; 30; 20 INJECTION, SOLUTION INTRAVENOUS at 03:06

## 2025-06-21 RX ADMIN — AZITHROMYCIN MONOHYDRATE 500 MG: 500 INJECTION, POWDER, LYOPHILIZED, FOR SOLUTION INTRAVENOUS at 01:06

## 2025-06-21 RX ADMIN — IPRATROPIUM BROMIDE AND ALBUTEROL SULFATE 3 ML: .5; 3 SOLUTION RESPIRATORY (INHALATION) at 07:06

## 2025-06-21 RX ADMIN — CEFTRIAXONE SODIUM 1 G: 1 INJECTION, POWDER, FOR SOLUTION INTRAMUSCULAR; INTRAVENOUS at 01:06

## 2025-06-21 RX ADMIN — FAMOTIDINE 20 MG: 20 TABLET, FILM COATED ORAL at 02:06

## 2025-06-21 NOTE — H&P
[facility]  Emergency Dept    History & Physical      Patient Name: Patric Snyder  MRN: [id number]  Admission Date: 6/21/2025  Attending Physician: Daniel Aceves MD   Primary Care Provider: Jennifer Jeronimo MD         Patient information was obtained from patient and ER records.     Subjective:     Principal Problem:<principal problem not specified>    Chief Complaint:   Chief Complaint   Patient presents with    Fatigue     States he was at his friends house and had a near syncope episode. C/o generalized weakness and back pain. States he has episodes of AMS. GCS 15 in triage.         HPI:   Mr. Snyder is a 69-year-old male who presented to the ED earlier today per POV after experiencing a near syncopal episode at a friend's house just prior to arrival.  While talking with friends he developed generalized weakness and confusion which was transient.  It had resolved by the time he arrived at the hospital.  On arrival his blood pressure was 97/55, pulse 67 and regular, respiratory rate 18, and temp 101° F. O2 saturation on room air was 93%.  His evaluation in the ED revealed bilateral patchy infiltrates on CXR consistent with pneumonia.  Labs revealed leukocytosis, and elevated BUN/creatinine consistent with possible ASIA.  He was given an initial 1 L fluid bolus per sepsis protocol, pan-cultured and started on ceftriaxone/azithromycin.  During his stay his blood pressure dropped into the low 80s which prompted a 2 L bolus.  This was in progress when I saw him in the ED.  On seeing him, he had no complaints.  He stated he was feeling much better.  He noted that he had been feeling more poorly than usually over the past couple of days but had no specific issues.  He noted no changes in respiration or problems with activity.  He is typically active, has no real limitations on activity.  He does not experience dyspnea on exertion and has had no episodes of chest pain, lower extremity edema other  heart failure symptoms.  He is followed by Dr. Sorenson and states he thinks his EF is around 45%.    Past Medical History:   Diagnosis Date    Depression     Heart failure, unspecified     Hyperlipidemia     Hypertension        Past Surgical History:   Procedure Laterality Date    BACK SURGERY      CARDIAC DEFIBRILLATOR PLACEMENT      CARDIAC VALVE REPLACEMENT      FINGER AMPUTATION      SPLENECTOMY, PARTIAL         Review of patient's allergies indicates:  No Known Allergies    No current facility-administered medications on file prior to encounter.     Current Outpatient Medications on File Prior to Encounter   Medication Sig    atorvastatin (LIPITOR) 80 MG tablet Take 80 mg by mouth every evening.    carvediloL (COREG) 25 MG tablet Take 1 tablet by mouth once daily.    cephALEXin (KEFLEX) 500 MG capsule Take 1 capsule (500 mg total) by mouth every 12 (twelve) hours.    cloNIDine (CATAPRES) 0.1 MG tablet Take 1 tablet twice a day by oral route.    docusate sodium (COLACE) 100 MG capsule Take 1 capsule by mouth once daily.    DULoxetine (CYMBALTA) 60 MG capsule Take 1 capsule by mouth once daily.    enalapril (VASOTEC) 20 MG tablet Take 1 tablet by mouth once daily.    evolocumab (REPATHA SYRINGE) 140 mg/mL Syrg Inject 140 mg into the skin every 30 days. 2 x month    ferrous sulfate 325 (65 FE) MG EC tablet Take by oral route.    furosemide (LASIX) 40 MG tablet Take 40 mg by mouth once daily.    methadone (DOLOPHINE) 10 MG tablet Take 1 tablet by mouth every 8 (eight) hours.    metoprolol succinate 25 mg CSpX Take 25 mg by mouth once daily.    multivitamin capsule Take 1 capsule by mouth once daily.    nitroGLYCERIN (NITROSTAT) 0.3 MG SL tablet Place 0.3 mg under the tongue every 5 (five) minutes as needed for Chest pain.    oxyCODONE-acetaminophen (PERCOCET)  mg per tablet Take 1 tablet by mouth 2 (two) times daily as needed.    potassium chloride (MICRO-K) 10 MEQ CpSR Take 10 mEq by mouth once daily.     pregabalin (LYRICA) 150 MG capsule Take 150 mg by mouth once daily.    sacubitriL-valsartan (ENTRESTO) 49-51 mg per tablet Take 1 tablet by mouth 2 (two) times daily.    sildenafiL (VIAGRA) 100 MG tablet Take 1 tablet by mouth as needed.    traZODone (DESYREL) 50 MG tablet Take 50 mg by mouth every evening.    triamterene-hydrochlorothiazide 37.5-25 mg (DYAZIDE) 37.5-25 mg per capsule Take 1 capsule every day by oral route.    warfarin (COUMADIN) 2.5 MG tablet Take 1 tablet by mouth Daily.    zolpidem (AMBIEN CR) 12.5 MG CR tablet Take 10 mg by mouth every evening.     Family History       Problem Relation (Age of Onset)    Diabetes Mother          Tobacco Use    Smoking status: Former     Types: Cigarettes    Smokeless tobacco: Current     Types: Chew   Substance and Sexual Activity    Alcohol use: Yes     Comment: occasion    Drug use: Never    Sexual activity: Yes     Partners: Female     Review of Systems   Constitutional:  Positive for fever.   HENT: Negative.     Respiratory: Negative.     Cardiovascular: Negative.    Gastrointestinal: Negative.    Genitourinary: Negative.    Musculoskeletal: Negative.    Neurological: Negative.    Psychiatric/Behavioral: Negative.       Objective:     Vital Signs (Most Recent):  Temp: (!) 101 °F (38.3 °C) (06/21/25 1253)  Pulse: 70 (06/21/25 1418)  Resp: 17 (06/21/25 1418)  BP: (!) 83/40 (06/21/25 1418)  SpO2: 96 % (06/21/25 1418) Vital Signs (24h Range):  Temp:  [101 °F (38.3 °C)] 101 °F (38.3 °C)  Pulse:  [65-74] 70  Resp:  [11-23] 17  SpO2:  [84 %-97 %] 96 %  BP: (80-97)/(40-55) 83/40     Weight: 54.4 kg (120 lb)  Body mass index is 17.72 kg/m².    Physical exam  Constitution-thin, normally developed male in NAD  Eyes-PERRL, EOMI  HENT-normocephalic, atraumatic; external ears appear normal; moist mucous membranes  Neck-supple  Respiratory-normal respirations; CTA with good air movement  Heart-RRR; normal S1, mechanical S2; no murmurs, gallops  Abdomen-soft, nontender,  "nondistended; active bowel sounds  Genitourinary-deferred  Musculoskeletal-no joint abnormalities, normal ROM throughout; sutured laceration left forearm; no edema  Skin-warm, dry; no rashes  Neurologic-alert and oriented x3; nonfocal exam    Scheduled Meds:   albuterol-ipratropium  3 mL Nebulization TID    azithromycin  500 mg Intravenous ED 1 Time    azithromycin  500 mg Intravenous Q24H    cefTRIAXone (Rocephin) IV (PEDS and ADULTS)  1 g Intravenous Q24H    famotidine  20 mg Oral Daily    sodium chloride 0.9%  250 mL Intravenous ED 1 Time     Continuous Infusions:  PRN Meds:.  Current Facility-Administered Medications:     acetaminophen, 650 mg, Oral, Q4H PRN    ondansetron, 4 mg, Intravenous, Q6H PRN    sodium chloride 0.9%, 10 mL, Intravenous, PRN      Significant Labs: All pertinent labs within the past 24 hours have been reviewed.  CBC:   Recent Labs   Lab 06/21/25  1300   WBC 18.13*   HGB 9.2*   HCT 29.0*        CMP:   Recent Labs   Lab 06/21/25  1300      K 3.5      CO2 23      BUN 49.0*   CREATININE 3.23*   CALCIUM 7.8*   PROT 6.3   ALBUMIN 3.4   BILITOT 0.6   ALKPHOS 82   AST 17   ALT 10     Cardiac Markers:   Recent Labs   Lab 06/21/25  1300   .6*     Lactic Acid:   Recent Labs   Lab 06/21/25  1300   LACTATE 1.0     Magnesium: No results for input(s): "MG" in the last 48 hours.  Troponin:   Recent Labs   Lab 06/21/25  1300   TROPONINI 0.033     TSH: No results for input(s): "TSH" in the last 4320 hours.  Urine Studies:   Recent Labs   Lab 06/21/25  1338   COLORU Yellow   APPEARANCEUA Clear   PHUR 5.5   PROTEINUA Negative   GLUCUA Negative   BILIRUBINUA Negative   OCCULTUA Trace-Intact*   NITRITE Negative   UROBILINOGEN 0.2   LEUKOCYTESUR Negative   RBCUA 0-2   WBCUA None Seen   BACTERIA None Seen       Significant Imaging:   CT head  Impression:  No acute intracranial abnormality.    CXR  Impression:  Cardiac enlargement.  Confluent ground-glass airspace disease in the " right mid to lower lung zone and in the left lower lung zone which may reflect multifocal pneumonia and or asymmetric edema.      EKG sinus rhythm with probable LBBB    Assessment/Plan:   Bilateral, multifocal CPAP with sepsis  Empiric IV antibiotic coverage with ceftriaxone, azithromycin  Bronchodilator therapy  Supplemental O2 to maintain O2 saturation greater than 90%    Hypotension  He has received 1250 ml IV crystalloid while in the ED  Continue cautious maintenance fluids  Clinically, he appears hemodynamically stable, he is not in shock    Chronic HFrEF/AICD, ppm status placed earlier this year  Euvolemic  Reported EF 45%  GDMT includes beta-blocker and Entresto; will hold these due to low blood pressures    Acute kidney injury  Baseline BUN/CR unknown  Most recent available labs showed normal indices in 2022  Continue to monitor  Avoid nephrotoxic agents as possible    VHD  S/p AVR with mechanical valve in 1999    Tobacco use  Patient uses snuff    Chronic methadone use  Continue methadone    GI prophylaxis: Famotidine    Code status: Full        VTE Risk Mitigation (From admission, onward)           Ordered     IP VTE HIGH RISK PATIENT  Once         06/21/25 1422     Place sequential compression device  Until discontinued         06/21/25 1422                  Critical care time:  35 minutes    Hector Qureshi MD  Department of Hospital Medicine   [facility] - Emergency Dept

## 2025-06-21 NOTE — PLAN OF CARE
Problem: Adult Inpatient Plan of Care  Goal: Plan of Care Review  Outcome: Ongoing  Goal: Patient-Specific Goal (Individualized)  Outcome: Ongoing  Goal: Absence of Hospital-Acquired Illness or Injury  Outcome: Ongoing  Goal: Optimal Comfort and Wellbeing  Outcome: Ongoing  Goal: Readiness for Transition of Care  Outcome: Ongoing     Problem: Gas Exchange Impaired  Goal: Optimal Gas Exchange  Outcome: Ongoing     Problem: Sepsis/Septic Shock  Goal: Optimal Coping  Outcome: Ongoing  Goal: Absence of Infection Signs and Symptoms  Outcome: Ongoing  Goal: Optimal Nutrition Intake  Outcome: Ongoing     Problem: Syncope  Goal: Absence of Syncopal Symptoms  Outcome: Ongoing     Problem: Acute Kidney Injury/Impairment  Goal: Fluid and Electrolyte Balance  Outcome: Ongoing  Goal: Improved Oral Intake  Outcome: Ongoing  Goal: Effective Renal Function  Outcome: Ongoing     Problem: Pneumonia  Goal: Fluid Balance  Outcome: Ongoing  Goal: Resolution of Infection Signs and Symptoms  Outcome: Ongoing  Goal: Effective Oxygenation and Ventilation  Outcome: Ongoing

## 2025-06-21 NOTE — ED PROVIDER NOTES
Encounter Date: 6/21/2025       History     Chief Complaint   Patient presents with    Fatigue     States he was at his friends house and had a near syncope episode. C/o generalized weakness and back pain. States he has episodes of AMS. GCS 15 in triage.      69 yrs old male presents with near syncope episodes and altered mental status while at a friends house today. Patient reports generalized weakness and back pain since this morning. Patient does have a history of heart failure, depression, hyperlipidemia, and hypertension    The history is provided by the patient and a friend.     Review of patient's allergies indicates:  No Known Allergies  Past Medical History:   Diagnosis Date    Depression     Heart failure, unspecified     Hyperlipidemia     Hypertension      Past Surgical History:   Procedure Laterality Date    BACK SURGERY      CARDIAC DEFIBRILLATOR PLACEMENT      CARDIAC VALVE REPLACEMENT      FINGER AMPUTATION      SPLENECTOMY, PARTIAL       Family History   Problem Relation Name Age of Onset    Diabetes Mother       Social History[1]  Review of Systems   Constitutional:  Positive for chills, fatigue and fever.   HENT:  Negative for sore throat and trouble swallowing.    Respiratory:  Negative for apnea, cough, choking, chest tightness, shortness of breath, wheezing and stridor.    Cardiovascular:  Negative for chest pain, palpitations and leg swelling.   Gastrointestinal:  Negative for abdominal pain, nausea and vomiting.   Genitourinary:  Negative for dysuria.   Musculoskeletal:  Positive for back pain.   Neurological:  Positive for weakness. Negative for dizziness, tremors, seizures, syncope, facial asymmetry, speech difficulty, light-headedness, numbness and headaches.   All other systems reviewed and are negative.      Physical Exam     Initial Vitals [06/21/25 1245]   BP Pulse Resp Temp SpO2   (!) 97/55 67 18 (!) 101 °F (38.3 °C) (!) 93 %      MAP       --         Physical Exam    Nursing note and  vitals reviewed.  Constitutional: He appears well-developed and well-nourished.   HENT:   Head: Normocephalic and atraumatic.   Right Ear: External ear normal.   Left Ear: External ear normal.   Nose: Nose normal. Mouth/Throat: Oropharynx is clear and moist.   Eyes: Conjunctivae and EOM are normal.   Neck: Neck supple.   Normal range of motion.  Cardiovascular:  Normal rate, regular rhythm, normal heart sounds and intact distal pulses.           Pulmonary/Chest: Breath sounds normal.   Abdominal: Abdomen is soft. Bowel sounds are normal. There is no abdominal tenderness.   Musculoskeletal:         General: Normal range of motion.      Cervical back: Normal range of motion and neck supple.     Neurological: He is alert and oriented to person, place, and time. He has normal strength and normal reflexes. He displays no atrophy, no tremor and normal reflexes. No cranial nerve deficit or sensory deficit. He exhibits normal muscle tone. He displays no seizure activity. Coordination and gait normal. GCS score is 15. GCS eye subscore is 4. GCS verbal subscore is 5. GCS motor subscore is 6.   Skin: Skin is warm and dry. Capillary refill takes less than 2 seconds.   Psychiatric: He has a normal mood and affect. His behavior is normal. Judgment and thought content normal.         ED Course   Critical Care    Date/Time: 6/21/2025 2:09 PM    Performed by: Vel Sherman FNP  Authorized by: Daniel Aceves MD  Direct patient critical care time: 15 minutes  Additional history critical care time: 5 minutes  Ordering / reviewing critical care time: 5 minutes  Documentation critical care time: 10 minutes  Consulting other physicians critical care time: 5 minutes  Total critical care time (exclusive of procedural time) : 40 minutes  Critical care was necessary to treat or prevent imminent or life-threatening deterioration of the following conditions: sepsis.  Critical care was time spent personally by me on the following  activities: examination of patient, evaluation of patient's response to treatment, ordering and review of laboratory studies, ordering and review of radiographic studies, pulse oximetry and re-evaluation of patient's condition.        Labs Reviewed   COMPREHENSIVE METABOLIC PANEL - Abnormal       Result Value    Sodium 139      Potassium 3.5      Chloride 107      CO2 23      Glucose 102      Blood Urea Nitrogen 49.0 (*)     Creatinine 3.23 (*)     Calcium 7.8 (*)     Protein Total 6.3      Albumin 3.4      Globulin 2.9      Albumin/Globulin Ratio 1.2      Bilirubin Total 0.6      ALP 82      ALT 10      AST 17      eGFR 20      Anion Gap 9.0      BUN/Creatinine Ratio 15     URINALYSIS, REFLEX TO URINE CULTURE - Abnormal    Color, UA Yellow      Appearance, UA Clear      Specific Gravity, UA 1.010      pH, UA 5.5      Protein, UA Negative      Glucose, UA Negative      Ketones, UA Negative      Blood, UA Trace-Intact (*)     Bilirubin, UA Negative      Urobilinogen, UA 0.2      Nitrites, UA Negative      Leukocyte Esterase, UA Negative     PROTIME-INR - Abnormal    PT 28.5 (*)     INR 2.7 (*)     Narrative:     Protimes are used to monitor anticoagulant agents such as warfarin. PT INR values are based on the current patient normal mean and the LATIA value for the specific instrument reagent used.  **Routine theraputic target values for the INR are 2.0-3.0**   CBC WITH DIFFERENTIAL - Abnormal    WBC 18.13 (*)     RBC 2.97 (*)     Hgb 9.2 (*)     Hct 29.0 (*)     MCV 97.6 (*)     MCH 31.0      MCHC 31.7 (*)     RDW 15.1      Platelet 276      MPV 11.6 (*)     Neut % 81.4      Lymph % 6.2      Mono % 9.3      Eos % 2.7      Basophil % 0.1      Imm Grans % 0.3      Neut # 14.76 (*)     Lymph # 1.12      Mono # 1.68 (*)     Eos # 0.49      Baso # 0.02      Imm Gran # 0.06 (*)    B-TYPE NATRIURETIC PEPTIDE - Abnormal    Natriuretic Peptide 803.6 (*)    TROPONIN I - Normal    Troponin-I 0.033     LACTIC ACID, PLASMA - Normal     Lactic Acid Level 1.0     COVID/RSV/FLU A&B PCR - Normal    Influenza A PCR Not Detected      Influenza B PCR Not Detected      Respiratory Syncytial Virus PCR Not Detected      SARS-CoV-2 PCR Not Detected      Narrative:     The Xpert Xpress SARS-CoV-2/FLU/RSV plus is a rapid, multiplexed real-time PCR test intended for the simultaneous qualitative detection and differentiation of SARS-CoV-2, Influenza A, Influenza B, and respiratory syncytial virus (RSV) viral RNA in either nasopharyngeal swab or nasal swab specimens.         STREP GROUP A BY PCR - Normal    STREP A PCR ([facility]) Not Detected      Narrative:     The Xpert Xpress Strep A test is a rapid, qualitative in vitro diagnostic test for the detection of Streptococcus pyogenes (Group A ß-hemolytic Streptococcus, Strep A) in throat swab specimens from patients with signs and symptoms of pharyngitis.     LIPASE - Normal    Lipase Level 15     APTT - Normal    PTT 29.9     URINALYSIS, MICROSCOPIC - Normal    Bacteria, UA None Seen      RBC, UA 0-2      WBC, UA None Seen      Squamous Epithelial Cells, UA Rare     BLOOD CULTURE [facility]   BLOOD CULTURE [facility]   CBC W/ AUTO DIFFERENTIAL    Narrative:     The following orders were created for panel order CBC auto differential.  Procedure                               Abnormality         Status                     ---------                               -----------         ------                     CBC with Differential[[id number]]       Abnormal            Final result                 Please view results for these tests on the individual orders.     EKG Readings: (Independently Interpreted)   Initial Reading: No STEMI. Rhythm: Normal Sinus Rhythm. Heart Rate: 66. Ectopy: No Ectopy. Conduction: Normal. ST Segments: Normal ST Segments. T Waves: Normal. Clinical Impression: Normal Sinus Rhythm   Reviewed by Dr. Aceves     ECG Results              EKG 12-lead (In process)        Collection Time Result Time QRS Duration [facility]  QTC Calculation    06/21/25 13:02:09 06/21/25 13:21:30 124 413                     In process by Interface, Lab In [facility] (06/21/25 13:21:38)                   Narrative:    Test Reason : R55,    Vent. Rate :  66 BPM     Atrial Rate :  66 BPM     P-R Int : 160 ms          QRS Dur : 124 ms      QT Int : 394 ms       P-R-T Axes :    -39 104 degrees    QTcB Int : 413 ms    Sinus rhythm with Premature atrial complexes  Left axis deviation  Anterior infarct ,age undetermined  Abnormal ECG  No previous ECGs available    Referred By: AAAREFERRAL SELF           Confirmed By:                                   Imaging Results              CT Head Without Contrast (Final result)  Result time 06/21/25 13:32:46      Final result by Jason Pak MD (06/21/25 13:32:46)                   Impression:      No acute intracranial abnormality.      Electronically signed by: Jason Pak MD  Date:    06/21/2025  Time:    13:32               Narrative:    EXAMINATION:  CT head without contrast    CLINICAL HISTORY:  Transient ischemic attack    COMPARISON:  04/24/2021    TECHNIQUE:  Routine CT of the head was without contrast    Total DLP: 1116 mGy.cm    Automatic exposure control was utilized to reduce the patient's dose    FINDINGS:  There is no acute intra hemorrhage, midline shift, mass-effect, or extra-axial collection.  Intracranial vascular calcifications noted.  The ventricles are normal in size and configuration for patient's age.  No sulcal effacement.  Gray-white matter differentiation is preserved.  There are minimal patchy areas of decreased attenuation in periventricular and subcortical white matter, while nonspecific, most commonly sequela of chronic microvascular ischemia.    The visualized osseous structures are intact.  The visualized paranasal sinuses and mastoid air cells are clear                                       X-Ray Chest AP Portable (Final result)  Result time 06/21/25 13:34:59      Final result by  Jason Pak MD (06/21/25 13:34:59)                   Impression:      Cardiac enlargement.    Confluent ground-glass airspace disease in the right mid to lower lung zone and in the left lower lung zone which may reflect multifocal pneumonia and or asymmetric edema.      Electronically signed by: Jason Pak MD  Date:    06/21/2025  Time:    13:34               Narrative:    EXAMINATION:  Chest one view    CLINICAL HISTORY:  Syncope    COMPARISON:  11/19/2021    FINDINGS:  Silhouette is enlarged.  Pacer device with dual leads noted in similar position.  Central vessels are normal.  There is confluent ground-glass airspace disease seen in the right mid to lower lung zone.    Small area of confluent ground-glass airspace disease in the left lower lung zone noted.    No visible pneumothorax    No pleural effusion.                                       Medications   azithromycin (ZITHROMAX) 500 mg in 0.9% NaCl 250 mL IVPB (admixture device) (500 mg Intravenous New Bag 6/21/25 1355)   sodium chloride 0.9% bolus 500 mL 500 mL (500 mLs Intravenous New Bag 6/21/25 1354)   sodium chloride 0.9% flush 10 mL (has no administration in time range)   acetaminophen tablet 650 mg (650 mg Oral Given 6/21/25 1253)   sodium chloride 0.9% bolus 500 mL 500 mL (0 mLs Intravenous Stopped 6/21/25 1357)   cefTRIAXone injection 1 g (1 g Intravenous Given 6/21/25 1354)     Medical Decision Making  69 yrs old male presents with near syncope episodes and altered mental status while at a friends house today. Patient reports generalized weakness and back pain since this morning. Patient does have a history of heart failure, depression, hyperlipidemia, and hypertension. Dr. Aceves spoke with Dr. Qureshi who agrees patient needs to be admitted into hospital. Dr. Qureshi recommend putting patient in ICU for further monitoring. Reviewed case with Dr. Aceves who agrees patient needs to be admitted into hospital for further evaluation. Dr. Aceves  "in to see patient.       Amount and/or Complexity of Data Reviewed  Labs: ordered.  Radiology: ordered.    Risk  OTC drugs.  Prescription drug management.  Decision regarding hospitalization.               ED Course as of 06/21/25 1423   Sat Jun 21, 2025   1351 Dr. Aceves spoke with Dr. Qureshi regarding admission into hospital [BB]      ED Course User Index  [BB] Vel Sherman FNP    Sepsis Perfusion Assessment: "I attest a sepsis perfusion exam was performed within 6 hours of sepsis, severe sepsis, or septic shock presentation, following fluid resuscitation."          Medical Decision Making:   Differential Diagnosis:   Sepsis, Urinary Tract infection, Covid, Influenza, RSV, Strep Throat, Pneumonia             Clinical Impression:  Final diagnoses:  [R55] Syncope  [N17.9] Acute kidney injury (Primary)  [J18.9] Pneumonia of both lungs due to infectious organism, unspecified part of lung          ED Disposition Condition    Admit                       [1]   Social History  Tobacco Use    Smoking status: Former     Types: Cigarettes    Smokeless tobacco: Current     Types: Chew   Substance Use Topics    Alcohol use: Yes     Comment: occasion    Drug use: Never        Vel Sherman FNP  06/21/25 1424    "

## 2025-06-22 PROBLEM — J18.9 PNEUMONIA OF BOTH LUNGS DUE TO INFECTIOUS ORGANISM: Status: ACTIVE | Noted: 2025-06-22

## 2025-06-22 LAB
ABS NEUT CALC (OHS): 23.51 X10(3)/MCL (ref 2.1–9.2)
ANION GAP SERPL CALC-SCNC: 5 MEQ/L
BUN SERPL-MCNC: 35 MG/DL (ref 8.4–25.7)
BURR CELLS (OLG): ABNORMAL
CALCIUM SERPL-MCNC: 8.2 MG/DL (ref 8.8–10)
CHLORIDE SERPL-SCNC: 110 MMOL/L (ref 98–107)
CO2 SERPL-SCNC: 26 MMOL/L (ref 23–31)
CREAT SERPL-MCNC: 1.86 MG/DL (ref 0.72–1.25)
CREAT/UREA NIT SERPL: 19
EOSINOPHIL NFR BLD MANUAL: 0.26 X10(3)/MCL (ref 0–0.9)
EOSINOPHIL NFR BLD MANUAL: 1 % (ref 0–8)
ERYTHROCYTE [DISTWIDTH] IN BLOOD BY AUTOMATED COUNT: 15.3 % (ref 11.5–17)
GFR SERPLBLD CREATININE-BSD FMLA CKD-EPI: 39 ML/MIN/1.73/M2
GLUCOSE SERPL-MCNC: 98 MG/DL (ref 82–115)
HCT VFR BLD AUTO: 28.6 % (ref 42–52)
HGB BLD-MCNC: 9.2 G/DL (ref 14–18)
INR PPP: 2.9
LYMPHOCYTES NFR BLD MANUAL: 1.31 X10(3)/MCL (ref 0.6–4.6)
LYMPHOCYTES NFR BLD MANUAL: 5 % (ref 13–40)
MAGNESIUM SERPL-MCNC: 2 MG/DL (ref 1.6–2.6)
MCH RBC QN AUTO: 30.9 PG (ref 27–31)
MCHC RBC AUTO-ENTMCNC: 32.2 G/DL (ref 33–36)
MCV RBC AUTO: 96 FL (ref 80–94)
MONOCYTES NFR BLD MANUAL: 1.04 X10(3)/MCL (ref 0.1–1.3)
MONOCYTES NFR BLD MANUAL: 4 % (ref 2–11)
NEUTROPHILS NFR BLD MANUAL: 89 % (ref 47–80)
NEUTS BAND NFR BLD MANUAL: 1 % (ref 0–11)
NRBC BLD AUTO-RTO: 0.1 %
OHS QRS DURATION: 124 MS
OHS QTC CALCULATION: 413 MS
PLATELET # BLD AUTO: 261 X10(3)/MCL (ref 130–400)
PLATELET # BLD EST: ADEQUATE 10*3/UL
PMV BLD AUTO: 11.3 FL (ref 7.4–10.4)
POIKILOCYTOSIS BLD QL SMEAR: ABNORMAL
POTASSIUM SERPL-SCNC: 3.9 MMOL/L (ref 3.5–5.1)
PROTHROMBIN TIME: 30.6 SECONDS (ref 12.4–14.9)
RBC # BLD AUTO: 2.98 X10(6)/MCL (ref 4.7–6.1)
RBC MORPH BLD: ABNORMAL
SODIUM SERPL-SCNC: 141 MMOL/L (ref 136–145)
WBC # BLD AUTO: 26.12 X10(3)/MCL (ref 4.5–11.5)

## 2025-06-22 PROCEDURE — 25000003 PHARM REV CODE 250: Performed by: EMERGENCY MEDICINE

## 2025-06-22 PROCEDURE — 36415 COLL VENOUS BLD VENIPUNCTURE: CPT | Performed by: EMERGENCY MEDICINE

## 2025-06-22 PROCEDURE — 25000242 PHARM REV CODE 250 ALT 637 W/ HCPCS: Performed by: EMERGENCY MEDICINE

## 2025-06-22 PROCEDURE — 85610 PROTHROMBIN TIME: CPT | Performed by: EMERGENCY MEDICINE

## 2025-06-22 PROCEDURE — 21400001 HC TELEMETRY ROOM

## 2025-06-22 PROCEDURE — 11000001 HC ACUTE MED/SURG PRIVATE ROOM

## 2025-06-22 PROCEDURE — 83735 ASSAY OF MAGNESIUM: CPT | Performed by: EMERGENCY MEDICINE

## 2025-06-22 PROCEDURE — 80048 BASIC METABOLIC PNL TOTAL CA: CPT | Performed by: EMERGENCY MEDICINE

## 2025-06-22 PROCEDURE — 94761 N-INVAS EAR/PLS OXIMETRY MLT: CPT

## 2025-06-22 PROCEDURE — 94640 AIRWAY INHALATION TREATMENT: CPT

## 2025-06-22 PROCEDURE — 99900035 HC TECH TIME PER 15 MIN (STAT)

## 2025-06-22 PROCEDURE — 85025 COMPLETE CBC W/AUTO DIFF WBC: CPT | Performed by: EMERGENCY MEDICINE

## 2025-06-22 PROCEDURE — 63600175 PHARM REV CODE 636 W HCPCS: Performed by: EMERGENCY MEDICINE

## 2025-06-22 RX ADMIN — FAMOTIDINE 20 MG: 20 TABLET, FILM COATED ORAL at 08:06

## 2025-06-22 RX ADMIN — ATORVASTATIN CALCIUM 80 MG: 40 TABLET, FILM COATED ORAL at 08:06

## 2025-06-22 RX ADMIN — WARFARIN SODIUM 2.5 MG: 2.5 TABLET ORAL at 04:06

## 2025-06-22 RX ADMIN — AZITHROMYCIN MONOHYDRATE 500 MG: 500 INJECTION, POWDER, LYOPHILIZED, FOR SOLUTION INTRAVENOUS at 02:06

## 2025-06-22 RX ADMIN — IPRATROPIUM BROMIDE AND ALBUTEROL SULFATE 3 ML: .5; 3 SOLUTION RESPIRATORY (INHALATION) at 07:06

## 2025-06-22 RX ADMIN — SODIUM CHLORIDE, POTASSIUM CHLORIDE, SODIUM LACTATE AND CALCIUM CHLORIDE: 600; 310; 30; 20 INJECTION, SOLUTION INTRAVENOUS at 05:06

## 2025-06-22 RX ADMIN — PREGABALIN 150 MG: 75 CAPSULE ORAL at 07:06

## 2025-06-22 RX ADMIN — TRAZODONE HYDROCHLORIDE 50 MG: 50 TABLET ORAL at 08:06

## 2025-06-22 RX ADMIN — METHADONE HYDROCHLORIDE 10 MG: 10 TABLET ORAL at 05:06

## 2025-06-22 RX ADMIN — IPRATROPIUM BROMIDE AND ALBUTEROL SULFATE 3 ML: .5; 3 SOLUTION RESPIRATORY (INHALATION) at 01:06

## 2025-06-22 RX ADMIN — DULOXETINE HYDROCHLORIDE 60 MG: 60 CAPSULE, DELAYED RELEASE PELLETS ORAL at 08:06

## 2025-06-22 RX ADMIN — CEFTRIAXONE SODIUM 1 G: 1 INJECTION, POWDER, FOR SOLUTION INTRAMUSCULAR; INTRAVENOUS at 02:06

## 2025-06-22 RX ADMIN — METHADONE HYDROCHLORIDE 10 MG: 10 TABLET ORAL at 01:06

## 2025-06-22 NOTE — PROGRESS NOTES
[facility] - ICU Hospital Medicine  Progress Note    Patient Name: Patric Snyder  MRN: [id number]  Patient Class: IP- Inpatient   Admission Date: 6/21/2025  Length of Stay: 1 days  Attending Physician: Hector Qureshi MD  Primary Care Provider: Jennifer Jeronimo MD        Subjective:     Principal Problem:Pneumonia of both lungs due to infectious organism    Interval History:   HPI:  Mr. Snyder is a 69-year-old male who presented to the ED earlier today per POV after experiencing a near syncopal episode at a friend's house just prior to arrival.  While talking with friends he developed generalized weakness and confusion which was transient.  It had resolved by the time he arrived at the hospital.  On arrival his blood pressure was 97/55, pulse 67 and regular, respiratory rate 18, and temp 101° F. O2 saturation on room air was 93%.  His evaluation in the ED revealed bilateral patchy infiltrates on CXR consistent with pneumonia.  Labs revealed leukocytosis, and elevated BUN/creatinine consistent with possible ASIA.  He was given an initial 1 L fluid bolus per sepsis protocol, pan-cultured and started on ceftriaxone/azithromycin.  During his stay his blood pressure dropped into the low 80s which prompted a 2 L bolus.  This was in progress when I saw him in the ED.  On seeing him, he had no complaints.  He stated he was feeling much better.  He noted that he had been feeling more poorly than usually over the past couple of days but had no specific issues.  He noted no changes in respiration or problems with activity.  He is typically active, has no real limitations on activity.  He does not experience dyspnea on exertion and has had no episodes of chest pain, lower extremity edema other heart failure symptoms.  He is followed by Dr. Sorenson and states he thinks his EF is around 45%.    6/22-There were no events overnight; when seen on rounds this morning was doing well and had no complaints  Downgrade  to M/S status      Objective:     Vital Signs (Most Recent):  Temp: 97.8 °F (36.6 °C) (06/22/25 1115)  Pulse: 65 (06/22/25 1150)  Resp: 16 (06/22/25 1150)  BP: (!) 97/58 (06/22/25 1150)  SpO2: 95 % (06/22/25 1150) Vital Signs (24h Range):  Temp:  [97.8 °F (36.6 °C)-101 °F (38.3 °C)] 97.8 °F (36.6 °C)  Pulse:  [61-74] 65  Resp:  [7-29] 16  SpO2:  [83 %-100 %] 95 %  BP: ()/(22-85) 97/58     Weight: 56.9 kg (125 lb 7.1 oz)  Body mass index is 18 kg/m².    Intake/Output Summary (Last 24 hours) at 6/22/2025 1238  Last data filed at 6/22/2025 1156  Gross per 24 hour   Intake 3702.5 ml   Output 2200 ml   Net 1502.5 ml      Physical exam  Constitution-thin, normally developed male in NAD  Eyes-PERRL, EOMI  HENT-normocephalic, atraumatic; external ears appear normal; moist mucous membranes  Neck-supple  Respiratory-normal respirations; CTA with good air movement  Heart-RRR; normal S1, mechanical S2; no murmurs, gallops  Abdomen-soft, nontender, nondistended; active bowel sounds  Genitourinary-deferred  Musculoskeletal-no joint abnormalities, normal ROM throughout; sutured laceration left forearm; no edema  Skin-warm, dry; no rashes  Neurologic-alert and oriented x3; nonfocal exam    Scheduled Meds:   albuterol-ipratropium  3 mL Nebulization TID    atorvastatin  80 mg Oral QHS    azithromycin  500 mg Intravenous Q24H    cefTRIAXone (Rocephin) IV (PEDS and ADULTS)  1 g Intravenous Q24H    DULoxetine  60 mg Oral Daily    famotidine  20 mg Oral Daily    methadone  10 mg Oral Q8H    pregabalin  150 mg Oral Daily    traZODone  50 mg Oral QHS    warfarin  2.5 mg Oral Daily     Continuous Infusions:   lactated ringers   Intravenous Continuous 50 mL/hr at 06/22/25 1156 Rate Verify at 06/22/25 1156     PRN Meds:.  Current Facility-Administered Medications:     acetaminophen, 650 mg, Oral, Q4H PRN    ondansetron, 4 mg, Intravenous, Q6H PRN    sodium chloride 0.9%, 10 mL, Intravenous, PRN    Significant Labs: All pertinent labs  within the past 24 hours have been reviewed.  CBC:   Recent Labs   Lab 06/21/25  1300 06/22/25  0430   WBC 18.13* 26.12*   HGB 9.2* 9.2*   HCT 29.0* 28.6*    261     CMP:   Recent Labs   Lab 06/21/25  1300 06/22/25  0430    141   K 3.5 3.9    110*   CO2 23 26    98   BUN 49.0* 35.0*   CREATININE 3.23* 1.86*   CALCIUM 7.8* 8.2*   PROT 6.3  --    ALBUMIN 3.4  --    BILITOT 0.6  --    ALKPHOS 82  --    AST 17  --    ALT 10  --      Cardiac Markers:   Recent Labs   Lab 06/21/25  1300   .6*     Magnesium:   Recent Labs   Lab 06/22/25  0430   MG 2.00     Urine Studies:   Recent Labs   Lab 06/21/25  1338   COLORU Yellow   APPEARANCEUA Clear   PHUR 5.5   PROTEINUA Negative   GLUCUA Negative   BILIRUBINUA Negative   OCCULTUA Trace-Intact*   NITRITE Negative   UROBILINOGEN 0.2   LEUKOCYTESUR Negative   RBCUA 0-2   WBCUA None Seen   BACTERIA None Seen       Significant Imaging:   CT head  Impression:  No acute intracranial abnormality.     CXR  Impression:  Cardiac enlargement.  Confluent ground-glass airspace disease in the right mid to lower lung zone and in the left lower lung zone which may reflect multifocal pneumonia and or asymmetric edema.        EKG sinus rhythm with probable LBBB    Assessment/Plan:   Bilateral, multifocal CPAP with sepsis  Continue Empiric IV antibiotic coverage with ceftriaxone, azithromycin  Bronchodilator therapy  Normal O2 saturations on RA     Hypotension  He received 1250 ml IV crystalloid while in the ED  BP continues to trend low; clinically, hemodynamically stable  Continue gentle IV hydration     Chronic HFrEF/AICD, ppm status placed earlier this year  Euvolemic  Reported EF 45%  GDMT includes beta-blocker and Entresto; continue to hold these due to low blood pressures     Acute kidney injury  Baseline BUN/CR unknown  Most recent available labs showed normal indices in 2022  Indices trending down  Avoid nephrotoxic agents as possible     VHD  S/p AVR with  mechanical valve in 1999     Tobacco use  Patient uses snuff     Chronic methadone use  Continue methadone     GI prophylaxis: Famotidine     Code status: Full   Active Diagnoses:    Diagnosis Date Noted POA    PRINCIPAL PROBLEM:  Pneumonia of both lungs due to infectious organism [J18.9] 06/22/2025 Yes      Problems Resolved During this Admission:     VTE Risk Mitigation (From admission, onward)           Ordered     warfarin (COUMADIN) tablet 2.5 mg  Daily         06/21/25 1501     IP VTE HIGH RISK PATIENT  Once         06/21/25 1422     Place sequential compression device  Until discontinued         06/21/25 1422                       Hector Qureshi MD  Department of Hospital Medicine   [facility] [place] [facility]

## 2025-06-22 NOTE — PLAN OF CARE
Problem: Adult Inpatient Plan of Care  Goal: Plan of Care Review  Outcome: Progressing  Goal: Patient-Specific Goal (Individualized)  Outcome: Progressing  Goal: Absence of Hospital-Acquired Illness or Injury  Outcome: Progressing  Goal: Optimal Comfort and Wellbeing  Outcome: Progressing  Goal: Readiness for Transition of Care  Outcome: Progressing     Problem: Gas Exchange Impaired  Goal: Optimal Gas Exchange  Outcome: Progressing     Problem: Sepsis/Septic Shock  Goal: Optimal Coping  Outcome: Progressing  Goal: Absence of Infection Signs and Symptoms  Outcome: Progressing  Goal: Optimal Nutrition Intake  Outcome: Progressing     Problem: Syncope  Goal: Absence of Syncopal Symptoms  Outcome: Progressing     Problem: Acute Kidney Injury/Impairment  Goal: Fluid and Electrolyte Balance  Outcome: Progressing  Goal: Improved Oral Intake  Outcome: Progressing  Goal: Effective Renal Function  Outcome: Progressing     Problem: Pneumonia  Goal: Fluid Balance  Outcome: Progressing  Goal: Resolution of Infection Signs and Symptoms  Outcome: Progressing  Goal: Effective Oxygenation and Ventilation  Outcome: Progressing     Problem: Fall Injury Risk  Goal: Absence of Fall and Fall-Related Injury  Outcome: Progressing     Problem: Comorbidity Management  Goal: Maintenance of Heart Failure Symptom Control  Outcome: Progressing  Goal: Blood Pressure in Desired Range  Outcome: Progressing

## 2025-06-23 VITALS
WEIGHT: 125.44 LBS | HEIGHT: 70 IN | SYSTOLIC BLOOD PRESSURE: 149 MMHG | RESPIRATION RATE: 20 BRPM | HEART RATE: 67 BPM | BODY MASS INDEX: 17.96 KG/M2 | OXYGEN SATURATION: 100 % | DIASTOLIC BLOOD PRESSURE: 74 MMHG | TEMPERATURE: 98 F

## 2025-06-23 LAB
ANION GAP SERPL CALC-SCNC: 8 MEQ/L
BASOPHILS # BLD AUTO: 0.04 X10(3)/MCL
BASOPHILS NFR BLD AUTO: 0.2 %
BUN SERPL-MCNC: 24 MG/DL (ref 8.4–25.7)
CALCIUM SERPL-MCNC: 8.3 MG/DL (ref 8.8–10)
CHLORIDE SERPL-SCNC: 109 MMOL/L (ref 98–107)
CO2 SERPL-SCNC: 24 MMOL/L (ref 23–31)
CREAT SERPL-MCNC: 1.45 MG/DL (ref 0.72–1.25)
CREAT/UREA NIT SERPL: 17
EOSINOPHIL # BLD AUTO: 0.32 X10(3)/MCL (ref 0–0.9)
EOSINOPHIL NFR BLD AUTO: 1.8 %
ERYTHROCYTE [DISTWIDTH] IN BLOOD BY AUTOMATED COUNT: 15.2 % (ref 11.5–17)
GFR SERPLBLD CREATININE-BSD FMLA CKD-EPI: 52 ML/MIN/1.73/M2
GLUCOSE SERPL-MCNC: 98 MG/DL (ref 82–115)
HCT VFR BLD AUTO: 28.8 % (ref 42–52)
HGB BLD-MCNC: 9 G/DL (ref 14–18)
IMM GRANULOCYTES # BLD AUTO: 0.09 X10(3)/MCL (ref 0–0.04)
IMM GRANULOCYTES NFR BLD AUTO: 0.5 %
INR PPP: 2.8
LYMPHOCYTES # BLD AUTO: 1.28 X10(3)/MCL (ref 0.6–4.6)
LYMPHOCYTES NFR BLD AUTO: 7.3 %
MAGNESIUM SERPL-MCNC: 2.1 MG/DL (ref 1.6–2.6)
MCH RBC QN AUTO: 31.3 PG (ref 27–31)
MCHC RBC AUTO-ENTMCNC: 31.3 G/DL (ref 33–36)
MCV RBC AUTO: 100 FL (ref 80–94)
MONOCYTES # BLD AUTO: 1.83 X10(3)/MCL (ref 0.1–1.3)
MONOCYTES NFR BLD AUTO: 10.5 %
NEUTROPHILS # BLD AUTO: 13.91 X10(3)/MCL (ref 2.1–9.2)
NEUTROPHILS NFR BLD AUTO: 79.7 %
PLATELET # BLD AUTO: 191 X10(3)/MCL (ref 130–400)
PMV BLD AUTO: 12.6 FL (ref 7.4–10.4)
POTASSIUM SERPL-SCNC: 4 MMOL/L (ref 3.5–5.1)
PROTHROMBIN TIME: 29.5 SECONDS (ref 12.4–14.9)
RBC # BLD AUTO: 2.88 X10(6)/MCL (ref 4.7–6.1)
SODIUM SERPL-SCNC: 141 MMOL/L (ref 136–145)
WBC # BLD AUTO: 17.47 X10(3)/MCL (ref 4.5–11.5)

## 2025-06-23 PROCEDURE — 83735 ASSAY OF MAGNESIUM: CPT | Performed by: EMERGENCY MEDICINE

## 2025-06-23 PROCEDURE — 85610 PROTHROMBIN TIME: CPT | Performed by: EMERGENCY MEDICINE

## 2025-06-23 PROCEDURE — 25000242 PHARM REV CODE 250 ALT 637 W/ HCPCS: Performed by: EMERGENCY MEDICINE

## 2025-06-23 PROCEDURE — 85025 COMPLETE CBC W/AUTO DIFF WBC: CPT | Performed by: EMERGENCY MEDICINE

## 2025-06-23 PROCEDURE — 25000003 PHARM REV CODE 250: Performed by: EMERGENCY MEDICINE

## 2025-06-23 PROCEDURE — 80048 BASIC METABOLIC PNL TOTAL CA: CPT | Performed by: EMERGENCY MEDICINE

## 2025-06-23 PROCEDURE — 36415 COLL VENOUS BLD VENIPUNCTURE: CPT | Performed by: EMERGENCY MEDICINE

## 2025-06-23 PROCEDURE — 99900035 HC TECH TIME PER 15 MIN (STAT)

## 2025-06-23 PROCEDURE — 94761 N-INVAS EAR/PLS OXIMETRY MLT: CPT

## 2025-06-23 PROCEDURE — 94799 UNLISTED PULMONARY SVC/PX: CPT

## 2025-06-23 PROCEDURE — 94640 AIRWAY INHALATION TREATMENT: CPT

## 2025-06-23 RX ORDER — CEFDINIR 300 MG/1
300 CAPSULE ORAL 2 TIMES DAILY
Qty: 10 CAPSULE | Refills: 0 | Status: SHIPPED | OUTPATIENT
Start: 2025-06-23 | End: 2025-06-28

## 2025-06-23 RX ORDER — AZITHROMYCIN 500 MG/1
500 TABLET, FILM COATED ORAL DAILY
Qty: 3 TABLET | Refills: 0 | Status: SHIPPED | OUTPATIENT
Start: 2025-06-23 | End: 2025-06-26

## 2025-06-23 RX ADMIN — PREGABALIN 150 MG: 75 CAPSULE ORAL at 09:06

## 2025-06-23 RX ADMIN — DULOXETINE HYDROCHLORIDE 60 MG: 60 CAPSULE, DELAYED RELEASE PELLETS ORAL at 09:06

## 2025-06-23 RX ADMIN — IPRATROPIUM BROMIDE AND ALBUTEROL SULFATE 3 ML: .5; 3 SOLUTION RESPIRATORY (INHALATION) at 07:06

## 2025-06-23 RX ADMIN — FAMOTIDINE 20 MG: 20 TABLET, FILM COATED ORAL at 09:06

## 2025-06-23 RX ADMIN — METHADONE HYDROCHLORIDE 10 MG: 10 TABLET ORAL at 06:06

## 2025-06-23 NOTE — DISCHARGE SUMMARY
[facility] - Medical Surgical Unit  Hospital Medicine  Discharge Summary      Patient Name: Patric Snyder  MRN: [id number]  Admission Date: 6/21/2025  Hospital Length of Stay: 2 days  Discharge Date and Time: 06/23/2025 10:24 AM  Attending Physician: Hector Qureshi MD   Discharging Provider: Hector Qureshi MD  Discharge Provider Team: Networked reference to record PCT   Primary Care Provider: Jennifer Jeronimo MD        HPI:    Mr. Snyder is a 69-year-old male who presented to the ED earlier today per POV after experiencing a near syncopal episode at a friend's house just prior to arrival.  While talking with friends he developed generalized weakness and confusion which was transient.  It had resolved by the time he arrived at the hospital.  On arrival his blood pressure was 97/55, pulse 67 and regular, respiratory rate 18, and temp 101° F. O2 saturation on room air was 93%.  His evaluation in the ED revealed bilateral patchy infiltrates on CXR consistent with pneumonia.  Labs revealed leukocytosis, and elevated BUN/creatinine consistent with possible ASIA.  He was given an initial 1 L fluid bolus per sepsis protocol, pan-cultured and started on ceftriaxone/azithromycin.  During his stay his blood pressure dropped into the low 80s which prompted a 2 L bolus.  This was in progress when I saw him in the ED.  On seeing him, he had no complaints.  He stated he was feeling much better.  He noted that he had been feeling more poorly than usually over the past couple of days but had no specific issues.  He noted no changes in respiration or problems with activity.  He is typically active, has no real limitations on activity.  He does not experience dyspnea on exertion and has had no episodes of chest pain, lower extremity edema other heart failure symptoms.  He is followed by Dr. Sorenson and states he thinks his EF is around 45%.    * No surgery found *      Hospital Course:   6/22-There were no events  overnight; when seen on rounds this morning was doing well and had no complaints  Downgrade to M/S status    6/23-he has continued to improve; today he has no complaints and is stating he is ready to go home; he has normal O2 saturations on RA    Discharge diagnoses    Bilateral, multifocal CPAP with sepsis  Respiratory status is at baseline  Has been afebrile; WBC is trending down  Okay to transition to oral antibiotics; discharge on cefdinir 300 mg b.i.d. for 5 days and azithromycin 500 mg daily for 3 days     Hypotension  He received 1250 ml IV crystalloid while in the ED  Blood pressure has normalized     Chronic HFrEF/AICD, ppm status placed earlier this year  Euvolemic  Reported EF 45%  GDMT includes beta-blocker and Entresto; okay to resume     Acute kidney injury  Baseline BUN/CR unknown  Most recent available labs showed normal indices in 2022  Indices trending down     VHD  S/p AVR with mechanical valve in 1999     Tobacco use  Patient uses snuff     Chronic methadone use  Continue methadone    Laceration left forearm  Sutures remain in place; these can removed on outpatient follow-up with PCP    Physical exam  Constitution-thin, normally developed male in NAD  Eyes-PERRL, EOMI  HENT-normocephalic, atraumatic  Neck-supple  Respiratory-normal respirations  Heart-RRR  Abdomen-soft, nontender, nondistended  Genitourinary-deferred  Musculoskeletal-no joint abnormalities, normal ROM throughout; sutured laceration left forearm; no edema  Skin-warm, dry; no rashes  Neurologic-alert and oriented x3; nonfocal exam    Consults:     Final Active Diagnoses:    Diagnosis Date Noted POA    PRINCIPAL PROBLEM:  Pneumonia of both lungs due to infectious organism [J18.9] 06/22/2025 Yes      Problems Resolved During this Admission:      Discharged Condition: stable    Disposition: Home or Self Care    Follow Up:   Follow-up Information       Jennifer Jeronimo MD Follow up in 1 week(s).    Specialty: Family  Medicine  Contact information:  [address] [place] [address] [phone]                           Patient Instructions:      Diet Cardiac     Activity as tolerated     Medications:  Reconciled Home Medications:      Medication List        START taking these medications      azithromycin 500 MG tablet  Commonly known as: ZITHROMAX  Take 1 tablet (500 mg total) by mouth once daily. for 3 days     cefdinir 300 MG capsule  Commonly known as: OMNICEF  Take 1 capsule (300 mg total) by mouth 2 (two) times daily. for 5 days            CONTINUE taking these medications      atorvastatin 80 MG tablet  Commonly known as: LIPITOR  Take 80 mg by mouth every evening.     carvediloL 25 MG tablet  Commonly known as: COREG  Take 1 tablet by mouth once daily.     cloNIDine 0.1 MG tablet  Commonly known as: CATAPRES  Take 1 tablet twice a day by oral route.     docusate sodium 100 MG capsule  Commonly known as: COLACE  Take 1 capsule by mouth once daily.     DULoxetine 60 MG capsule  Commonly known as: CYMBALTA  Take 1 capsule by mouth once daily.     enalapril 20 MG tablet  Commonly known as: VASOTEC  Take 1 tablet by mouth once daily.     ENTRESTO 49-51 mg per tablet  Generic drug: sacubitriL-valsartan  Take 1 tablet by mouth 2 (two) times daily.     ferrous sulfate 325 (65 FE) MG EC tablet  Take by oral route.     furosemide 40 MG tablet  Commonly known as: LASIX  Take 40 mg by mouth once daily.     methadone 10 MG tablet  Commonly known as: DOLOPHINE  Take 1 tablet by mouth every 8 (eight) hours.     metoprolol succinate 25 mg Cspx  Take 25 mg by mouth once daily.     multivitamin capsule  Take 1 capsule by mouth once daily.     nitroGLYCERIN 0.3 MG SL tablet  Commonly known as: NITROSTAT  Place 0.3 mg under the tongue every 5 (five) minutes as needed for Chest pain.     oxyCODONE-acetaminophen  mg per tablet  Commonly known as: PERCOCET  Take 1 tablet by mouth 2 (two) times daily as needed.     potassium chloride 10  MEQ Cpsr  Commonly known as: MICRO-K  Take 10 mEq by mouth once daily.     pregabalin 150 MG capsule  Commonly known as: LYRICA  Take 150 mg by mouth once daily.     REPATHA SYRINGE 140 mg/mL Syrg  Generic drug: evolocumab  Inject 140 mg into the skin every 30 days. 2 x month     sildenafiL 100 MG tablet  Commonly known as: VIAGRA  Take 1 tablet by mouth as needed.     traZODone 50 MG tablet  Commonly known as: DESYREL  Take 50 mg by mouth every evening.     triamterene-hydrochlorothiazide 37.5-25 mg 37.5-25 mg per capsule  Commonly known as: DYAZIDE  Take 1 capsule every day by oral route.     warfarin 2.5 MG tablet  Commonly known as: COUMADIN  Take 1 tablet by mouth Daily.     zolpidem 12.5 MG CR tablet  Commonly known as: AMBIEN CR  Take 10 mg by mouth every evening.            STOP taking these medications      cephALEXin 500 MG capsule  Commonly known as: KEFLEX              Significant Diagnostic Studies: Labs: CMP   Recent Labs   Lab 06/21/25  1300 06/22/25  0430 06/23/25  0447    141 141   K 3.5 3.9 4.0    110* 109*   CO2 23 26 24    98 98   BUN 49.0* 35.0* 24.0   CREATININE 3.23* 1.86* 1.45*   CALCIUM 7.8* 8.2* 8.3*   PROT 6.3  --   --    ALBUMIN 3.4  --   --    BILITOT 0.6  --   --    ALKPHOS 82  --   --    AST 17  --   --    ALT 10  --   --     and CBC   Recent Labs   Lab 06/21/25  1300 06/22/25  0430 06/23/25  0447   WBC 18.13* 26.12* 17.47*   HGB 9.2* 9.2* 9.0*   HCT 29.0* 28.6* 28.8*    261 191     Radiology:   CT head  Impression:  No acute intracranial abnormality.     CXR  Impression:  Cardiac enlargement.  Confluent ground-glass airspace disease in the right mid to lower lung zone and in the left lower lung zone which may reflect multifocal pneumonia and or asymmetric edema.        EKG sinus rhythm with probable LBBB    Pending Diagnostic Studies:       None          Indwelling Lines/Drains at time of discharge:   Lines/Drains/Airways       None                 Patient  screened for social drivers of health:  Housing instability  Transportation needs  Utility difficulties  Interpersonal safety  ---no needs identified    Time spent on the discharge of patient: 42 minutes         Hector Qureshi MD  Department of Hospital Medicine  [facility] - [facility] Surgical Unit

## 2025-06-27 LAB
BACTERIA BLD CULT: NORMAL
BACTERIA BLD CULT: NORMAL

## 2025-07-10 ENCOUNTER — HOSPITAL ENCOUNTER (OUTPATIENT)
Dept: RADIOLOGY | Facility: HOSPITAL | Age: 70
Discharge: HOME | End: 2025-07-10
Attending: FAMILY MEDICINE
Payer: MEDICARE

## 2025-07-10 DIAGNOSIS — J18.9 UNRESOLVED PNEUMONIA: ICD-10-CM

## 2025-07-10 PROCEDURE — 71046 X-RAY EXAM CHEST 2 VIEWS: CPT | Mod: TC

## 2025-07-15 ENCOUNTER — HOSPITAL ENCOUNTER (OUTPATIENT)
Dept: RADIOLOGY | Facility: HOSPITAL | Age: 70
Discharge: HOME | End: 2025-07-15
Attending: FAMILY MEDICINE
Payer: MEDICARE

## 2025-07-15 DIAGNOSIS — J18.9 UNRESOLVED PNEUMONIA: ICD-10-CM

## 2025-07-15 PROCEDURE — 71046 X-RAY EXAM CHEST 2 VIEWS: CPT | Mod: TC
